# Patient Record
Sex: MALE | Race: WHITE
[De-identification: names, ages, dates, MRNs, and addresses within clinical notes are randomized per-mention and may not be internally consistent; named-entity substitution may affect disease eponyms.]

---

## 2019-03-11 ENCOUNTER — HOSPITAL ENCOUNTER (OUTPATIENT)
Dept: HOSPITAL 47 - RADXRMAIN | Age: 54
Discharge: HOME | End: 2019-03-11
Payer: COMMERCIAL

## 2019-03-11 DIAGNOSIS — M19.012: Primary | ICD-10-CM

## 2019-03-11 NOTE — XR
Left shoulder

 

HISTORY: Left shoulder pain

 

3 views of the left shoulder

 

There is joint space loss, remodeling the humeral head, marginal spurring and subchondral sclerosis a
nd geode formation. Alignment is maintained. Left lung apex as visualized is normal. Arthropathy pres
ent at the acromioclavicular joint. No fracture or dislocation.

 

IMPRESSION: Osteoarthritis is advanced within the left shoulder.

## 2019-08-07 ENCOUNTER — HOSPITAL ENCOUNTER (OUTPATIENT)
Dept: HOSPITAL 47 - BARWHC3 | Age: 54
Discharge: HOME | End: 2019-08-07
Payer: COMMERCIAL

## 2019-08-07 VITALS — HEART RATE: 68 BPM | DIASTOLIC BLOOD PRESSURE: 82 MMHG | SYSTOLIC BLOOD PRESSURE: 179 MMHG | TEMPERATURE: 98.8 F

## 2019-08-07 VITALS — BODY MASS INDEX: 60.7 KG/M2

## 2019-08-07 DIAGNOSIS — K76.9: ICD-10-CM

## 2019-08-07 DIAGNOSIS — Z87.891: ICD-10-CM

## 2019-08-07 DIAGNOSIS — N19: ICD-10-CM

## 2019-08-07 DIAGNOSIS — E89.1: ICD-10-CM

## 2019-08-07 DIAGNOSIS — D50.9: ICD-10-CM

## 2019-08-07 DIAGNOSIS — K90.9: ICD-10-CM

## 2019-08-07 DIAGNOSIS — E66.01: Primary | ICD-10-CM

## 2019-08-07 DIAGNOSIS — E55.9: ICD-10-CM

## 2019-08-07 DIAGNOSIS — K50.90: ICD-10-CM

## 2019-08-07 DIAGNOSIS — E21.1: ICD-10-CM

## 2019-08-07 LAB
APTT BLD: 25.1 SEC (ref 22–30)
ERYTHROCYTE [DISTWIDTH] IN BLOOD BY AUTOMATED COUNT: 4.76 M/UL (ref 4.3–5.9)
ERYTHROCYTE [DISTWIDTH] IN BLOOD: 13.5 % (ref 11.5–15.5)
HCT VFR BLD AUTO: 41.5 % (ref 39–53)
HGB BLD-MCNC: 13.4 GM/DL (ref 13–17.5)
INR PPP: 1 (ref ?–1.2)
MCH RBC QN AUTO: 28.2 PG (ref 25–35)
MCHC RBC AUTO-ENTMCNC: 32.4 G/DL (ref 31–37)
MCV RBC AUTO: 87 FL (ref 80–100)
PLATELET # BLD AUTO: 339 K/UL (ref 150–450)
PT BLD: 10.3 SEC (ref 9–12)
WBC # BLD AUTO: 6.9 K/UL (ref 3.8–10.6)

## 2019-08-07 PROCEDURE — 36415 COLL VENOUS BLD VENIPUNCTURE: CPT

## 2019-08-07 PROCEDURE — 80061 LIPID PANEL: CPT

## 2019-08-07 PROCEDURE — 84134 ASSAY OF PREALBUMIN: CPT

## 2019-08-07 PROCEDURE — 84100 ASSAY OF PHOSPHORUS: CPT

## 2019-08-07 PROCEDURE — 84443 ASSAY THYROID STIM HORMONE: CPT

## 2019-08-07 PROCEDURE — 83036 HEMOGLOBIN GLYCOSYLATED A1C: CPT

## 2019-08-07 PROCEDURE — 84590 ASSAY OF VITAMIN A: CPT

## 2019-08-07 PROCEDURE — 80053 COMPREHEN METABOLIC PANEL: CPT

## 2019-08-07 PROCEDURE — 82306 VITAMIN D 25 HYDROXY: CPT

## 2019-08-07 PROCEDURE — 82728 ASSAY OF FERRITIN: CPT

## 2019-08-07 PROCEDURE — 85730 THROMBOPLASTIN TIME PARTIAL: CPT

## 2019-08-07 PROCEDURE — 83970 ASSAY OF PARATHORMONE: CPT

## 2019-08-07 PROCEDURE — 84425 ASSAY OF VITAMIN B-1: CPT

## 2019-08-07 PROCEDURE — 82746 ASSAY OF FOLIC ACID SERUM: CPT

## 2019-08-07 PROCEDURE — 84630 ASSAY OF ZINC: CPT

## 2019-08-07 PROCEDURE — 82607 VITAMIN B-12: CPT

## 2019-08-07 PROCEDURE — 83735 ASSAY OF MAGNESIUM: CPT

## 2019-08-07 PROCEDURE — 85610 PROTHROMBIN TIME: CPT

## 2019-08-07 PROCEDURE — 83550 IRON BINDING TEST: CPT

## 2019-08-07 PROCEDURE — 83540 ASSAY OF IRON: CPT

## 2019-08-07 PROCEDURE — 85027 COMPLETE CBC AUTOMATED: CPT

## 2019-08-07 PROCEDURE — 93005 ELECTROCARDIOGRAM TRACING: CPT

## 2019-08-07 PROCEDURE — 84255 ASSAY OF SELENIUM: CPT

## 2019-08-07 NOTE — P.HPBAR
Bariatric H&P





- History & Physicial


H&P Date: 19


History & Physicial: 


Visit/CC: 





Patient initial contact: 





Initial weight: 


Initial weight in pounds: 


Height: 5 ft 7 in


Initial BMI: 





Last weight: 





Current weight: 175.812 kg


Current weight in pounds: 


Current BMI: 





Ideal body weight (based on NIH guidelines): 





Excess body weight loss: 








The patient is a 54 year-old M who presents for Bariatric Assessment.








Highest was 415 pounds and on his own went down to 320 pounds. He needs shoulder

surgery and requested by his doctors to have weight loss surgery. He reports new

depression with loss of occupation. He has hypertension. He denies sleep apnea. 

He has history of blood clots. He had lost 100 pounds in the past with a 

nutritionist as he had leg swelling. 








Needs full work-up


Recommend EGD


Recommend EKG





Past Medical History


Past Medical History: GERD/Reflux


Additional Past Medical History / Comment(s): Meniere's disease (Takes Dyazide 

for this condition), Left shoulder pain "bone on bone",


History of Any Multi-Drug Resistant Organisms: None Reported


Past Surgical History: Hernia Repair


Additional Past Surgical History / Comment(s): Left leg varicose vein stripping 

and ablation and sclerotherapy (3 times), Right leg vein stripping and 

sclerotherapy, (pt states 2 different doctors have told him that he has the 

"worst veins they've ever seen in their career."), umbilical hernia repair with 

mesh, stents placed in bilateral exteriror iliac veins and common iliac veins 

(stents were placed through incisions on back),


Past Anesthesia/Blood Transfusion Reactions: No Reported Reaction


Additional Past Anesthesia/Blood Transfusion Reaction / Comm: No history of 

blood transfusions to date


Past Psychological History: Depression


Additional Psychological History / Comment(s): 19: Pt states he takes herbal

supplements (had been given an antidepressment but it made him deathly sick, was

given another med to try but was fearful and so has been doing well on his 

herbal supplements)


Smoking Status: Former smoker


Past Alcohol Use History: Occasional


Additional Past Alcohol Use History / Comment(s): patient smoked off and on 

beginning at age 14 quit at age 39, highest smoking amount was 1 pack/day.  May 

have one or two beers/week.


Additional Drug Use History / Comment(s): uses hemp oil twice daily





- Past Family History


  ** Mother


Additional Family Medical History / Comment(s): bilateral knee and hip 

replacement





  ** Father


Family Medical History: Osteoarthritis (OA)


Additional Family Medical History / Comment(s):  at age 78





Bariatric Checklist


Checklist: 


Plan: 





Checklist: 





EGD: 


1. Hiatal hernia: 


2. H. Pylori: 





HgbA1c: 





Vitamin D: 





Smoking: Former smoker





Primary care physician referral: 





Psychiatry clearance: 





Cardiology clearance: 





Sleep study: 





Diet journal: 





VTE risk score: 





VTE risk level: 





Rehab needs at discharge:

## 2019-08-08 LAB
ALBUMIN SERPL-MCNC: 4.2 G/DL (ref 3.8–4.9)
ALBUMIN/GLOB SERPL: 1.75 G/DL (ref 1.6–3.17)
ALP SERPL-CCNC: 62 U/L (ref 41–126)
ALT SERPL-CCNC: 24 U/L (ref 10–49)
ANION GAP SERPL CALC-SCNC: 11.2 MMOL/L (ref 4–12)
AST SERPL-CCNC: 32 U/L (ref 14–35)
BUN SERPL-SCNC: 11 MG/DL (ref 9–27)
BUN/CREAT SERPL: 13.75 RATIO (ref 12–20)
CALCIUM SPEC-MCNC: 9.3 MG/DL (ref 8.7–10.3)
CHLORIDE SERPL-SCNC: 103 MMOL/L (ref 96–109)
CHOLEST SERPL-MCNC: 201 MG/DL (ref 0–200)
CO2 SERPL-SCNC: 27.8 MMOL/L (ref 21.6–31.8)
GLOBULIN SER CALC-MCNC: 2.4 G/DL (ref 1.6–3.3)
GLUCOSE SERPL-MCNC: 86 MG/DL (ref 70–110)
HBA1C MFR BLD: 5.7 % (ref 4–6)
HDLC SERPL-MCNC: 74 MG/DL (ref 40–60)
LDLC SERPL CALC-MCNC: 109.2 MG/DL (ref 0–131)
MAGNESIUM SPEC-SCNC: 2 MG/DL (ref 1.5–2.4)
POTASSIUM SERPL-SCNC: 4 MMOL/L (ref 3.5–5.5)
PROT SERPL-MCNC: 6.6 G/DL (ref 6.2–8.2)
SODIUM SERPL-SCNC: 142 MMOL/L (ref 135–145)
TRIGL SERPL-MCNC: 89 MG/DL (ref 0–149)
VLDLC SERPL CALC-MCNC: 17.8 MG/DL (ref 5–40)
ZINC SERPL-MCNC: 80 UG/DL (ref 60–130)

## 2019-08-09 LAB — VIT B1 BLD-MCNC: 90 UG/L (ref 38–122)

## 2019-10-07 ENCOUNTER — HOSPITAL ENCOUNTER (OUTPATIENT)
Dept: HOSPITAL 47 - ORWHC2ENDO | Age: 54
Discharge: HOME | End: 2019-10-07
Payer: COMMERCIAL

## 2019-10-07 VITALS — RESPIRATION RATE: 18 BRPM

## 2019-10-07 VITALS — TEMPERATURE: 97.7 F

## 2019-10-07 VITALS — HEART RATE: 57 BPM | SYSTOLIC BLOOD PRESSURE: 132 MMHG | DIASTOLIC BLOOD PRESSURE: 77 MMHG

## 2019-10-07 VITALS — BODY MASS INDEX: 60.7 KG/M2

## 2019-10-07 DIAGNOSIS — K21.0: Primary | ICD-10-CM

## 2019-10-07 DIAGNOSIS — K29.50: ICD-10-CM

## 2019-10-07 DIAGNOSIS — Z87.891: ICD-10-CM

## 2019-10-07 DIAGNOSIS — I10: ICD-10-CM

## 2019-10-07 DIAGNOSIS — F41.9: ICD-10-CM

## 2019-10-07 DIAGNOSIS — Z88.8: ICD-10-CM

## 2019-10-07 DIAGNOSIS — H81.09: ICD-10-CM

## 2019-10-07 DIAGNOSIS — K44.9: ICD-10-CM

## 2019-10-07 DIAGNOSIS — Z79.82: ICD-10-CM

## 2019-10-07 DIAGNOSIS — E66.01: ICD-10-CM

## 2019-10-07 DIAGNOSIS — M19.90: ICD-10-CM

## 2019-10-07 DIAGNOSIS — Z88.5: ICD-10-CM

## 2019-10-07 DIAGNOSIS — Z79.899: ICD-10-CM

## 2019-10-07 PROCEDURE — 88305 TISSUE EXAM BY PATHOLOGIST: CPT

## 2019-10-07 PROCEDURE — 43239 EGD BIOPSY SINGLE/MULTIPLE: CPT

## 2019-10-07 NOTE — P.PCN
Date of Procedure: 10/07/19


Description of Procedure: 











PREOPERATIVE DIAGNOSIS:


Gastroesophageal reflux disease.


Morbid obesity.





POSTOPERATIVE DIAGNOSIS:


Morbid obesity.


Gastritis.


Gastroesophageal reflux disease.


Diaphragmatic hiatal hernia





OPERATION:


Esophagogastroduodenoscopy with biopsies along antrum.





SURGEON: Suze Panda MD





ANESTHESIA: MAC.





INDICATIONS:


The patient is a 44-year-old female who presents with a history of reflux 

disease. Benefits and risks of the procedure were described. Informed consent 

was obtained.





DESCRIPTION:


The patient was brought into the endoscopy suite and laid in the left lateral 

decubitus position. An Olympus gastroscope was passed along the posterior 

oropharynx down to the distal esophagus where the squamocolumnar junction was 

encountered at 40 cm from the incisors. The stomach was entered and no bile 

reflux was found.  Additional findings are listed below. Biopsies with cold f

orceps were obtained of the antrum. The first through third portion of the 

duodenum was examined and unremarkable. Retroflexion of the scope confirmed  

Hill grade 2 lower esophageal valve. The squamocolumnar junction demonstrated LA

grade B erosive esophagitis. The stomach was desufflated. The patient tolerated 

the procedure well.





FINDINGS:


Squamocolumnar junction 40 cm from the incisors.


Diaphragmatic hiatus at 40 cm.


Hill grade 2 lower esophageal valve.


LA grade B erosive esophagitis.


No active duodenitis.


Chronic gastritis 





RECOMMENDATIONS:


Upper endoscopy as needed.





Plan - Discharge Summary


Discharge Rx Participant: No


New Discharge Prescriptions: 


No Action


   Valerian Root 300 mg PO HS


   Ginger 500 mg PO BID


   Cider Vinegar [Apple Cider Vinegar] 300 mg PO DAILY


   Multivit-Min/FA/Lycopen/Lutein [Centrum Silver Tablet] 1 each PO DAILY


   Aspirin [Adult Low Dose Aspirin EC] 81 mg PO DAILY


   Turmeric Root Extract [Turmeric] 1,000 mg PO BID


   Glucosamine Sulfate 1,500 mg PO DAILY


   Omeprazole [PriLOSEC] 40 mg PO DAILY


   Meloxicam [Mobic] 15 mg PO DAILY


   Triamterene-Hctz 37.5-25Mg [Dyazide 37.5-25 Capsule] 1 cap PO DAILY


   Anxiety/Stress [employer] Brand 1 tab PO HS


   [place] Hemp Oil 20 drop PO BID


   Losartan Potassium [Cozaar] 50 mg PO DAILY


   Ferrous Sulfate [Feosol] 325 mg PO DAILY


   Cetirizine HCl [Zyrtec] 10 mg PO DAILY


Discharge Medication List





Anxiety/Stress [employer] Brand 1 tab PO HS 08/07/19 [History]


[place] Hemp Oil 20 drop PO BID 08/07/19 [History]


Aspirin [Adult Low Dose Aspirin EC] 81 mg PO DAILY 08/07/19 [History]


Cider Vinegar [Apple Cider Vinegar] 300 mg PO DAILY 08/07/19 [History]


Ginger 500 mg PO BID 08/07/19 [History]


Glucosamine Sulfate 1,500 mg PO DAILY 08/07/19 [History]


Meloxicam [Mobic] 15 mg PO DAILY 08/07/19 [History]


Multivit-Min/FA/Lycopen/Lutein [Centrum Silver Tablet] 1 each PO DAILY 08/07/19 

[History]


Omeprazole [PriLOSEC] 40 mg PO DAILY 08/07/19 [History]


Triamterene-Hctz 37.5-25Mg [Dyazide 37.5-25 Capsule] 1 cap PO DAILY 08/07/19 

[History]


Turmeric Root Extract [Turmeric] 1,000 mg PO BID 08/07/19 [History]


Valerian Root 300 mg PO HS 08/07/19 [History]


Cetirizine HCl [Zyrtec] 10 mg PO DAILY 10/02/19 [History]


Ferrous Sulfate [Feosol] 325 mg PO DAILY 10/02/19 [History]


Losartan Potassium [Cozaar] 50 mg PO DAILY 10/02/19 [History]








Follow up Appointment(s)/Referral(s): 


[facility] [[facility] - 10/16/19


Patient Instructions/Handouts:  *[facility] - (Anesthesia) Endoscopy Discharge 

Instructions, Upper Endoscopy (DC), Diet for Stomach Ulcers and Gastritis (GEN),

Gastritis (DC)


Discharge Disposition: HOME SELF-CARE

## 2019-10-16 ENCOUNTER — HOSPITAL ENCOUNTER (OUTPATIENT)
Dept: HOSPITAL 47 - BARWHC3 | Age: 54
Discharge: HOME | End: 2019-10-16
Payer: COMMERCIAL

## 2019-10-16 VITALS — BODY MASS INDEX: 61 KG/M2

## 2019-10-16 VITALS — TEMPERATURE: 97.8 F | HEART RATE: 63 BPM | SYSTOLIC BLOOD PRESSURE: 186 MMHG | DIASTOLIC BLOOD PRESSURE: 115 MMHG

## 2019-10-16 DIAGNOSIS — F32.9: ICD-10-CM

## 2019-10-16 DIAGNOSIS — I82.409: ICD-10-CM

## 2019-10-16 DIAGNOSIS — Z79.82: ICD-10-CM

## 2019-10-16 DIAGNOSIS — H81.09: ICD-10-CM

## 2019-10-16 DIAGNOSIS — Z87.891: ICD-10-CM

## 2019-10-16 DIAGNOSIS — K21.9: ICD-10-CM

## 2019-10-16 DIAGNOSIS — Z79.1: ICD-10-CM

## 2019-10-16 DIAGNOSIS — D50.9: ICD-10-CM

## 2019-10-16 DIAGNOSIS — I11.9: ICD-10-CM

## 2019-10-16 DIAGNOSIS — Z79.899: ICD-10-CM

## 2019-10-16 DIAGNOSIS — E55.9: ICD-10-CM

## 2019-10-16 DIAGNOSIS — Z88.6: ICD-10-CM

## 2019-10-16 DIAGNOSIS — K29.50: ICD-10-CM

## 2019-10-16 DIAGNOSIS — M47.816: ICD-10-CM

## 2019-10-16 DIAGNOSIS — E66.01: Primary | ICD-10-CM

## 2019-10-16 DIAGNOSIS — M17.10: ICD-10-CM

## 2019-10-16 PROCEDURE — 99211 OFF/OP EST MAY X REQ PHY/QHP: CPT

## 2019-10-16 NOTE — P.PN
Subjective


Progress Note Date: 10/16/19








DATE OF SERVICE: 10/16/2019





CHIEF COMPLAINT: Morbid obesity





HISTORY OF PRESENT ILLNESS:  Ovi Olivarez is a 54-year-old male who comes with 

lifelong morbid obesity. He is looking into the sleeve gastrectomy. He completed

an upper endoscopy. He is doing better. No reports of abdominal pain. He comes 

in with new findings of vitamin deficiency. He also comes in with new EKG 

abnormalities.





At height of 5 feet 7 inches, his ideal body weight is 158 pounds.   His highest

weight was 415 pounds. His highest BMI was 65.1. He comes in 387 pounds.  His 

body mass index is 60.7.  He is 229 pounds overweight.





PAST MEDICAL HISTORY: 


1.  Morbid obesity due to excess calories


2.  Body mass index of 65.1, initial


3.  Osteoarthritis of the knees.


4.  Osteoarthritis of the lower back.


5.  Hypertensive heart disease.


6.  Gastroesophageal reflux disease


7.  DVT legs


8.  Meniere's disease


9.  Depressive disorder





PAST SURGICAL HISTORY: 


1. Varicose vein stripping


2. Umbilical hernia repair


3. Stents bilateral common iliac vein





HOME MEDICATIONS: 


                                Home Medications











 Medication  Instructions  Recorded  Confirmed


 


Anxiety/Stress [employer] Brand 1 tab PO HS 08/07/19 08/07/19


 


[place] Hemp Oil 20 drop PO BID 08/07/19 08/07/19


 


Aspirin [Adult Low Dose Aspirin EC] 81 mg PO DAILY 08/07/19 08/07/19


 


Cider Vinegar [Apple Cider Vinegar] 300 mg PO DAILY 08/07/19 08/07/19


 


Charito 500 mg PO BID 08/07/19 08/07/19


 


Glucosamine Sulfate 1,500 mg PO DAILY 08/07/19 08/07/19


 


Meloxicam [Mobic] 15 mg PO DAILY 08/07/19 08/07/19


 


Multivit-Min/FA/Lycopen/Lutein 1 each PO DAILY 08/07/19 08/07/19





[Centrum Silver Tablet]   


 


Omeprazole [PriLOSEC] 40 mg PO DAILY 08/07/19 08/07/19


 


Triamterene-Hctz 37.5-25Mg 1 cap PO DAILY 08/07/19 08/07/19





[Dyazide 37.5-25 Capsule]   


 


Turmeric Root Extract [Turmeric] 1,000 mg PO BID 08/07/19 08/07/19


 


Valerian Root 300 mg PO HS 08/07/19 08/07/19











ALLERGIES:


                                    Allergies











Allergy/AdvReac Type Severity Reaction Status Date / Time


 


morphine Allergy  Itching Verified 08/07/19 16:05











SOCIAL HISTORY: Past tobacco use.   





FAMILY HISTORY: No family history of ulcerative colitis disease or Crohn's 

disease. Family history of morbid obesity. No lupus in the family.  No reports 

of stomach or esophageal cancer.  





REVIEW OF ORGAN SYSTEMS: 


CONSTITUTIONAL: At height of 5 feet 7 inches, his ideal body weight is 158 

pounds.   His highest weight was 415 pounds. His highest BMI was 65.1. He comes 

in 387 pounds.  His body mass index is 60.7.  He is 229 pounds overweight.


HEENT: Denies any active troubles with vision or hearing.  


ENDOCRINE: No diabetes. No hypothyroidism. 


CARDIOVASCULAR: No past reports of palpitations or heart attacks or chest pain. 

 


RESPIRATORY: Denies daytime somnolence. No asthma. 


GASTROINTESTINAL: Denies any bright red blood per rectum.  No diarrhea. No 

constipation.


MUSCULOSKELETAL: Has lower back pain and joint pain.  Has osteoarthritis of the 

knees.  History of bilateral lower extremity edema.


NEURO: No headaches. No seizure disorders. 


PSYCH: Has depression. No suicidal ideation.   


RHEUMATOLOGIC: No lupus.  No rheumatoid arthritis.  


HEMATOLOGIC: Denies any abnormal bleeding or bruising.  Personal history of 

DVTs.


SKIN: No rash.  No skin cancer.





PHYSICAL EXAM: 


VITAL SIGNS: Height 5 foot 7 inches, weight 389 pounds. BMI 61.1


                                   Vital Signs











Temp  97.8 F   10/16/19 16:16


 


Pulse  63   10/16/19 16:16


 


Resp      


 


BP  186/115   10/16/19 16:16


 


Pulse Ox      











GENERAL: Well-developed in no acute distress.  


HEENT: No scleral icterus.  Extraocular movements grossly intact.  Hears 

conversational speech.  No nasal drainage.


NECK: Supple without lymphadenopathy. 


CHEST: Nonlabored respirations with equal bilateral excursions. 


CARDIOVASCULAR: Regular rate and regular rhythm. Distal 2+ pulses. 


ABDOMEN: Obese, soft, nontender, nondistended. 


MUSCULOSKELETAL: No clubbing, cyanosis. Gross strength 5/5 distal lower 

extremities.  


NEURO: No focal or lateralizing signs. Cranial nerves 2 through 12 grossly 

within normal limits. 


PSYCH: Appropriate affect. Alert and oriented to person, place and time.


SKIN: Good skin turgor.  Well perfused.








                             Laboratory Last Values











WBC  6.9 k/uL (3.8-10.6)   08/07/19  17:42    


 


RBC  4.76 m/uL (4.30-5.90)   08/07/19  17:42    


 


Hgb  13.4 gm/dL (13.0-17.5)   08/07/19  17:42    


 


Hct  41.5 % (39.0-53.0)   08/07/19  17:42    


 


MCV  87.0 fL (80.0-100.0)   08/07/19  17:42    


 


MCH  28.2 pg (25.0-35.0)   08/07/19  17:42    


 


MCHC  32.4 g/dL (31.0-37.0)   08/07/19  17:42    


 


RDW  13.5 % (11.5-15.5)   08/07/19  17:42    


 


Plt Count  339 k/uL (150-450)   08/07/19  17:42    


 


PT  10.3 sec (9.0-12.0)   08/07/19  17:42    


 


INR  1.0  (<1.2)   08/07/19  17:42    


 


APTT  25.1 sec (22.0-30.0)   08/07/19  17:42    


 


Sodium  142 mmol/L (135-145)   08/07/19  17:42    


 


Potassium  4.0 mmol/L (3.5-5.5)   08/07/19  17:42    


 


Chloride  103 mmol/L ()   08/07/19  17:42    


 


Carbon Dioxide  27.8 mmol/L (21.6-31.8)   08/07/19  17:42    


 


Anion Gap  11.20 mmol/L (4.00-12.00)   08/07/19  17:42    


 


BUN  11.0 mg/dL (9.0-27.0)   08/07/19  17:42    


 


Creatinine  0.8 mg/dL (0.6-1.5)   08/07/19  17:42    


 


Est GFR (CKD-EPI)AfAm  115.7   (60.0-200.0)   08/07/19  17:42    


 


Est GFR (CKD-EPI)NonAf  99.9   (60.0-200.0)   08/07/19  17:42    


 


BUN/Creatinine Ratio  13.75 Ratio (12.00-20.00)   08/07/19  17:42    


 


Glucose  86 mg/dL ()   08/07/19  17:42    


 


Estimated Ave Glu mg/dL  117   08/07/19  17:42    


 


Hemoglobin A1c  5.7 % (4.0-6.0)   08/07/19  17:42    


 


Calcium  9.3 mg/dL (8.7-10.3)   08/07/19  17:42    


 


Phosphorus  4.2 mg/dL (2.4-5.1)   08/07/19  17:42    


 


Magnesium  2.0 mg/dL (1.5-2.4)   08/07/19  17:42    


 


Iron  50 ug/dL ()  L  08/07/19  17:42    


 


TIBC  327 ug/dL (228-460)   08/07/19  17:42    


 


Iron Saturation  15.29   (15.00-50.00)   08/07/19  17:42    


 


Ferritin  41.5 ng/mL (22.0-322.0)   08/07/19  17:42    


 


Total Bilirubin  0.5 mg/dL (0.3-1.2)   08/07/19  17:42    


 


AST  32 U/L (14-35)   08/07/19  17:42    


 


ALT  24 U/L (10-49)   08/07/19  17:42    


 


Alkaline Phosphatase  62 U/L ()   08/07/19  17:42    


 


Total Protein  6.6 g/dL (6.2-8.2)   08/07/19  17:42    


 


Albumin  4.20 g/dL (3.80-4.90)   08/07/19  17:42    


 


Globulin  2.4 g/dL (1.6-3.3)   08/07/19  17:42    


 


Albumin/Globulin Ratio  1.75 g/dL (1.60-3.17)   08/07/19  17:42    


 


Prealbumin  25.0 mg/dL (18.0-42.0)   08/07/19  17:42    


 


Triglycerides  89.0 mg/dL (0.0-149.0)   08/07/19  17:42    


 


Cholesterol  201 mg/dL (0-200)  H  08/07/19  17:42    


 


LDL Cholesterol, Calc  109.2 mg/dL (0.0-131.0)   08/07/19  17:42    


 


VLDL Cholesterol, Calc  17.80 mg/dL (5.00-40.00)   08/07/19  17:42    


 


HDL Cholesterol  74.0 mg/dL (40.0-60.0)  H  08/07/19  17:42    


 


Cholesterol/HDL Ratio  2.72   08/07/19  17:42    


 


Vitamin A  63 ug/dL ()   08/07/19  17:42    


 


Vitamin B1  90 ug/L ()   08/07/19  17:42    


 


Vitamin B12  565.0 pg/mL (200.0-944.0)   08/07/19  17:42    


 


Vitamin D 25-Hydroxy  23.4 ng/mL (30.0-100.0)  L  08/07/19  17:42    


 


Folate  >24.0 ng/mL  08/07/19  17:42    


 


TSH  0.690 uIU/mL (0.350-5.500)   08/07/19  17:42    


 


PTH Intact  50.9 pg/mL (14.0-72.0)   08/07/19  17:42    


 


Selenium  122 mcg/L ()   08/07/19  17:42    


 


Zinc  80 ug/dL ()   08/07/19  17:42    


 


EKG  EKG PERFORMED   08/07/19  17:42    








Iron is low


Vitamin D is low








STUDIES:   EKG shows right ventricular conduction delay





EGD FINDINGS:


Squamocolumnar junction 40 cm from the incisors.


Diaphragmatic hiatus at 40 cm.


Hill grade 2 lower esophageal valve.


LA grade B erosive esophagitis.


No active duodenitis.


Chronic gastritis 





Final Pathologic Diagnosis


GASTRIC ANTRUM, BIOPSIES: Chronic gastritis.





Helicobacter organisms are not identified on routine H+E stained sections.








ASSESSMENT: 


1.  Morbid obesity due to excess calories


2.  Body mass index of 65.1, initial


3.  Osteoarthritis of the knees.


4.  Osteoarthritis of the lower back.


5.  Hypertensive heart disease.


6.  Gastroesophageal reflux disease


7.  DVT legs


8.  Meniere's disease


9.  Depressive disorder


10.  Iron deficiency anemia


11.  Vitamin D deficiency


12.  Chronic gastritis





PLAN: 


1.  Overall, EGD reviewed and medications reviewed.


2.  He has EKG abnormality and recommend cardiac risk assessment. 


3.  Labs reviewed with vitamin D deficiency.


4.  [facility] reviewed.











Objective





- Vital Signs


Vital signs: 


                                 Intake & Output











 10/15/19 10/16/19 10/16/19





 18:59 06:59 18:59


 


Weight   176.901 kg

## 2019-11-04 ENCOUNTER — HOSPITAL ENCOUNTER (OUTPATIENT)
Dept: HOSPITAL 47 - BARWHC3 | Age: 54
Discharge: HOME | End: 2019-11-04
Payer: COMMERCIAL

## 2019-11-04 VITALS — BODY MASS INDEX: 60.6 KG/M2

## 2019-11-04 DIAGNOSIS — E66.01: Primary | ICD-10-CM

## 2019-11-04 PROCEDURE — 97804 MEDICAL NUTRITION GROUP: CPT

## 2019-11-14 NOTE — P.GSHP
History of Present Illness


H&P Date: 10/07/19














CHIEF COMPLAINT: GERD





HISTORY OF PRESENT ILLNESS: The patient is a 54-year-old male who


presents reports gastroesophageal reflux disease.  Upper endoscopy was offered 

for further evaluation and management.





PAST MEDICAL HISTORY: 


Please see list.





PAST SURGICAL HISTORY: 


Please see list.





MEDICATIONS: 


Please see list.





ALLERGIES:  Please see list. 





SOCIAL HISTORY: No illicit drug use





FAMILY HISTORY: No reports of Crohn disease or ulcerative colitis. 





REVIEW OF ORGAN SYSTEMS: 


CONSTITUTIONAL: No reports of fevers or chills. 


GI:  Denies any blood in stools or constipation. 





PHYSICAL EXAM: 


VITAL SIGNS:  Stable


GENERAL: Well-developed and pleasant in no acute distress. 


HEENT: No scleral icterus. Extraocular movements grossly


intact. Moist buccal mucosa. 


NECK: Supple without lymphadenopathy. 


CHEST: Unlabored respirations. Equal bilateral excursions. 


CARDIOVASCULAR: Regular rate and rhythm. Distal 2+ pulses. 


ABDOMEN: Soft, nondistended.  


MUSCULOSKELETAL: No clubbing, cyanosis, or edema. 





ASSESSMENT: 


1.  Gastroesophageal reflux disease





PLAN: 


1. Recommend proceeding with an upper endoscopy





Past Medical History


Past Medical History: GERD/Reflux, Hypertension, Osteoarthritis (OA)


Additional Past Medical History / Comment(s): Meniere's disease (Takes Dyazide 

for this condition), Left shoulder pain "bone on bone",


History of Any Multi-Drug Resistant Organisms: None Reported


Past Surgical History: Hernia Repair


Additional Past Surgical History / Comment(s): Left leg varicose vein stripping 

and ablation and sclerotherapy (3 times), Right leg vein stripping and 

sclerotherapy,  umbilical hernia repair with mesh, stents placed in bilateral 

exteriror iliac veins and common iliac veins (stents were placed through 

incisions on back),


Past Anesthesia/Blood Transfusion Reactions: No Reported Reaction


Additional Past Anesthesia/Blood Transfusion Reaction / Comment(s): No history 

of blood transfusions to date


Smoking Status: Former smoker





- Past Family History


  ** Mother


Family Medical History: No Reported History


Additional Family Medical History / Comment(s): bilateral knee and hip 

replacement





  ** Father


Family Medical History: Osteoarthritis (OA)


Additional Family Medical History / Comment(s):  at age 78





Medications and Allergies


                                Home Medications











 Medication  Instructions  Recorded  Confirmed  Type


 


Anxiety/Stress [employer] Brand 1 tab PO HS 08/07/19 10/02/19 History


 


[place] Hemp Oil 20 drop PO BID 08/07/19 10/02/19 History


 


Aspirin [Adult Low Dose Aspirin EC] 81 mg PO DAILY 08/07/19 10/02/19 History


 


Cider Vinegar [Apple Cider Vinegar] 300 mg PO DAILY 08/07/19 10/02/19 History


 


Charito 500 mg PO BID 08/07/19 10/02/19 History


 


Glucosamine Sulfate 1,500 mg PO DAILY 08/07/19 10/02/19 History


 


Meloxicam [Mobic] 15 mg PO DAILY 08/07/19 10/02/19 History


 


Multivit-Min/FA/Lycopen/Lutein 1 each PO DAILY 08/07/19 10/02/19 History





[Centrum Silver Tablet]    


 


Omeprazole [PriLOSEC] 40 mg PO DAILY 08/07/19 10/02/19 History


 


Triamterene-Hctz 37.5-25Mg 1 cap PO DAILY 08/07/19 10/02/19 History





[Dyazide 37.5-25 Capsule]    


 


Turmeric Root Extract [Turmeric] 1,000 mg PO BID 08/07/19 10/02/19 History


 


Valerian Root 300 mg PO HS 08/07/19 10/02/19 History


 


Cetirizine HCl [Zyrtec] 10 mg PO DAILY 10/02/19 10/02/19 History


 


Ferrous Sulfate [Feosol] 325 mg PO DAILY 10/02/19 10/02/19 History


 


Losartan Potassium [Cozaar] 50 mg PO DAILY 10/02/19 10/02/19 History








                                    Allergies











Allergy/AdvReac Type Severity Reaction Status Date / Time


 


morphine Allergy  Itching Verified 10/02/19 15:08


 


nortriptyline Allergy  NAUSEA , Verified 10/02/19 15:09





   HEADACHE Muscle Hinge Flap Text: The defect edges were debeveled with a #15 scalpel blade.  Given the size, depth and location of the defect and the proximity to free margins a muscle hinge flap was deemed most appropriate.  Using a sterile surgical marker, an appropriate hinge flap was drawn incorporating the defect. The area thus outlined was incised with a #15 scalpel blade.  The skin margins were undermined to an appropriate distance in all directions utilizing iris scissors.

## 2020-09-15 ENCOUNTER — HOSPITAL ENCOUNTER (OUTPATIENT)
Dept: HOSPITAL 47 - LABPAT | Age: 55
Discharge: HOME | End: 2020-09-15
Payer: COMMERCIAL

## 2020-09-15 DIAGNOSIS — Z01.818: Primary | ICD-10-CM

## 2020-09-15 LAB
ALBUMIN SERPL-MCNC: 4.3 G/DL (ref 3.5–5)
ALP SERPL-CCNC: 60 U/L (ref 38–126)
ALT SERPL-CCNC: 38 U/L (ref 4–49)
ANION GAP SERPL CALC-SCNC: 8 MMOL/L
AST SERPL-CCNC: 45 U/L (ref 17–59)
BASOPHILS # BLD AUTO: 0.1 K/UL (ref 0–0.2)
BASOPHILS NFR BLD AUTO: 1 %
BUN SERPL-SCNC: 25 MG/DL (ref 9–20)
CALCIUM SPEC-MCNC: 9.4 MG/DL (ref 8.4–10.2)
CHLORIDE SERPL-SCNC: 94 MMOL/L (ref 98–107)
CO2 SERPL-SCNC: 30 MMOL/L (ref 22–30)
EOSINOPHIL # BLD AUTO: 0.1 K/UL (ref 0–0.7)
EOSINOPHIL NFR BLD AUTO: 1 %
ERYTHROCYTE [DISTWIDTH] IN BLOOD BY AUTOMATED COUNT: 5.15 M/UL (ref 4.3–5.9)
ERYTHROCYTE [DISTWIDTH] IN BLOOD: 13.1 % (ref 11.5–15.5)
GLUCOSE SERPL-MCNC: 90 MG/DL (ref 74–99)
HCT VFR BLD AUTO: 43.9 % (ref 39–53)
HGB BLD-MCNC: 13.8 GM/DL (ref 13–17.5)
LYMPHOCYTES # SPEC AUTO: 1.2 K/UL (ref 1–4.8)
LYMPHOCYTES NFR SPEC AUTO: 15 %
MCH RBC QN AUTO: 26.9 PG (ref 25–35)
MCHC RBC AUTO-ENTMCNC: 31.5 G/DL (ref 31–37)
MCV RBC AUTO: 85.3 FL (ref 80–100)
MONOCYTES # BLD AUTO: 0.5 K/UL (ref 0–1)
MONOCYTES NFR BLD AUTO: 7 %
NEUTROPHILS # BLD AUTO: 6.1 K/UL (ref 1.3–7.7)
NEUTROPHILS NFR BLD AUTO: 76 %
PLATELET # BLD AUTO: 346 K/UL (ref 150–450)
POTASSIUM SERPL-SCNC: 4.5 MMOL/L (ref 3.5–5.1)
PROT SERPL-MCNC: 7.5 G/DL (ref 6.3–8.2)
SODIUM SERPL-SCNC: 132 MMOL/L (ref 137–145)
WBC # BLD AUTO: 8 K/UL (ref 3.8–10.6)

## 2020-09-15 PROCEDURE — 36415 COLL VENOUS BLD VENIPUNCTURE: CPT

## 2020-09-15 PROCEDURE — 93005 ELECTROCARDIOGRAM TRACING: CPT

## 2020-09-15 PROCEDURE — 86900 BLOOD TYPING SEROLOGIC ABO: CPT

## 2020-09-15 PROCEDURE — 80053 COMPREHEN METABOLIC PANEL: CPT

## 2020-09-15 PROCEDURE — 86901 BLOOD TYPING SEROLOGIC RH(D): CPT

## 2020-09-15 PROCEDURE — 86850 RBC ANTIBODY SCREEN: CPT

## 2020-09-15 PROCEDURE — 85025 COMPLETE CBC W/AUTO DIFF WBC: CPT

## 2020-09-21 ENCOUNTER — HOSPITAL ENCOUNTER (INPATIENT)
Dept: HOSPITAL 47 - 2ORMAIN | Age: 55
LOS: 4 days | Discharge: HOME | DRG: 620 | End: 2020-09-25
Payer: COMMERCIAL

## 2020-09-21 VITALS — BODY MASS INDEX: 56.8 KG/M2

## 2020-09-21 DIAGNOSIS — E66.01: Primary | ICD-10-CM

## 2020-09-21 DIAGNOSIS — D50.9: ICD-10-CM

## 2020-09-21 DIAGNOSIS — Z88.5: ICD-10-CM

## 2020-09-21 DIAGNOSIS — E55.9: ICD-10-CM

## 2020-09-21 DIAGNOSIS — Z79.899: ICD-10-CM

## 2020-09-21 DIAGNOSIS — E83.42: ICD-10-CM

## 2020-09-21 DIAGNOSIS — I11.9: ICD-10-CM

## 2020-09-21 DIAGNOSIS — M10.9: ICD-10-CM

## 2020-09-21 DIAGNOSIS — M47.9: ICD-10-CM

## 2020-09-21 DIAGNOSIS — E87.1: ICD-10-CM

## 2020-09-21 DIAGNOSIS — M17.0: ICD-10-CM

## 2020-09-21 DIAGNOSIS — Z79.82: ICD-10-CM

## 2020-09-21 DIAGNOSIS — K21.9: ICD-10-CM

## 2020-09-21 DIAGNOSIS — Z79.01: ICD-10-CM

## 2020-09-21 DIAGNOSIS — Z83.49: ICD-10-CM

## 2020-09-21 DIAGNOSIS — H81.09: ICD-10-CM

## 2020-09-21 DIAGNOSIS — Z88.8: ICD-10-CM

## 2020-09-21 DIAGNOSIS — J98.11: ICD-10-CM

## 2020-09-21 DIAGNOSIS — Z79.1: ICD-10-CM

## 2020-09-21 DIAGNOSIS — E87.8: ICD-10-CM

## 2020-09-21 DIAGNOSIS — F32.9: ICD-10-CM

## 2020-09-21 DIAGNOSIS — Z98.890: ICD-10-CM

## 2020-09-21 DIAGNOSIS — E86.1: ICD-10-CM

## 2020-09-21 DIAGNOSIS — T50.2X5A: ICD-10-CM

## 2020-09-21 DIAGNOSIS — Z86.718: ICD-10-CM

## 2020-09-21 DIAGNOSIS — Z87.891: ICD-10-CM

## 2020-09-21 DIAGNOSIS — K29.50: ICD-10-CM

## 2020-09-21 LAB
ALBUMIN SERPL-MCNC: 4.4 G/DL (ref 3.5–5)
ALP SERPL-CCNC: 58 U/L (ref 38–126)
ALT SERPL-CCNC: 56 U/L (ref 4–49)
ANION GAP SERPL CALC-SCNC: 11 MMOL/L
AST SERPL-CCNC: 77 U/L (ref 17–59)
BUN SERPL-SCNC: 30 MG/DL (ref 9–20)
CALCIUM SPEC-MCNC: 9.3 MG/DL (ref 8.4–10.2)
CHLORIDE SERPL-SCNC: 92 MMOL/L (ref 98–107)
CO2 SERPL-SCNC: 26 MMOL/L (ref 22–30)
GLUCOSE BLD-MCNC: 89 MG/DL (ref 75–99)
GLUCOSE SERPL-MCNC: 92 MG/DL (ref 74–99)
POTASSIUM SERPL-SCNC: 3.8 MMOL/L (ref 3.5–5.1)
PROT SERPL-MCNC: 7.6 G/DL (ref 6.3–8.2)
SODIUM SERPL-SCNC: 129 MMOL/L (ref 137–145)

## 2020-09-21 PROCEDURE — 84100 ASSAY OF PHOSPHORUS: CPT

## 2020-09-21 PROCEDURE — 94760 N-INVAS EAR/PLS OXIMETRY 1: CPT

## 2020-09-21 PROCEDURE — 80053 COMPREHEN METABOLIC PANEL: CPT

## 2020-09-21 PROCEDURE — 85025 COMPLETE CBC W/AUTO DIFF WBC: CPT

## 2020-09-21 PROCEDURE — 74240 X-RAY XM UPR GI TRC 1CNTRST: CPT

## 2020-09-21 PROCEDURE — 86850 RBC ANTIBODY SCREEN: CPT

## 2020-09-21 PROCEDURE — 86901 BLOOD TYPING SEROLOGIC RH(D): CPT

## 2020-09-21 PROCEDURE — 84300 ASSAY OF URINE SODIUM: CPT

## 2020-09-21 PROCEDURE — 94762 N-INVAS EAR/PLS OXIMTRY CONT: CPT

## 2020-09-21 PROCEDURE — 83930 ASSAY OF BLOOD OSMOLALITY: CPT

## 2020-09-21 PROCEDURE — 94640 AIRWAY INHALATION TREATMENT: CPT

## 2020-09-21 PROCEDURE — 86900 BLOOD TYPING SEROLOGIC ABO: CPT

## 2020-09-21 PROCEDURE — 84443 ASSAY THYROID STIM HORMONE: CPT

## 2020-09-21 PROCEDURE — 83935 ASSAY OF URINE OSMOLALITY: CPT

## 2020-09-21 PROCEDURE — 83735 ASSAY OF MAGNESIUM: CPT

## 2020-09-21 PROCEDURE — 88307 TISSUE EXAM BY PATHOLOGIST: CPT

## 2020-09-21 PROCEDURE — 84295 ASSAY OF SERUM SODIUM: CPT

## 2020-09-21 RX ADMIN — CEFAZOLIN SCH MLS: 330 INJECTION, POWDER, FOR SOLUTION INTRAMUSCULAR; INTRAVENOUS at 10:33

## 2020-09-21 RX ADMIN — CEFAZOLIN SCH MLS/HR: 330 INJECTION, POWDER, FOR SOLUTION INTRAMUSCULAR; INTRAVENOUS at 17:12

## 2020-09-21 NOTE — P.PN
Progress Note - Text


Progress Note Date: 09/21/20


Diet adjusted to bariatric full liquid diet for protein shakes. Patient being 

seen by medicine for management of hyponatremia. Pending resolution of 

hyponatremia, sleeve gastrectomy for Wednesday

## 2020-09-21 NOTE — P.CONS
History of Present Illness





- Reason for Consult


Consult date: 20


Hyponatremia


Requesting physician: Suze Panda





- Chief Complaint


Hyponatremia





- History of Present Illness





55-year-old male with PMH of morbid obesity, hypertension, DVT completed 

Coumadin therapy presents to [facility] for elective surgery.  

He was planned to undergo gastric sleeve.  His surgery got held due to 

hyponatremia.  [facility], [place] physicians has been consulted for medical management of 

this patient.





Patient reports vague headaches has been ongoing for the past 2 days.  He 

reports lightheadedness especially when going from a laying or sitting to a 

standing position.  He denies any lower extremity edema, nausea or vomiting, 

fever or chills, cough, chest pain, shortness of breath, palpitations, changes 

in urination or bowel habits.  No numbness/weakness/tingling of the extremities.

 Of note, patient reports a restricted diet to 800 mable over the last few weeks. 

He has also reporting decreased salt intake.





Review of Systems





Pertinent positives and negatives as discussed in HPI, a complete review of 

systems was performed and all other systems are negative.





Past Medical History


Past Medical History: Chest Pain / Angina, Deep Vein Thrombosis (DVT), 

GERD/Reflux, Hypertension, Osteoarthritis (OA)


Additional Past Medical History / Comment(s): Meniere's disease (Takes Dyazide 

for this condition), LEFT LEG DVT; Left shoulder pain "bone on bone", varicose 

veins, "irritation in stomach", hx gout, LEFT LEG GANGRENE


History of Any Multi-Drug Resistant Organisms: None Reported


Past Surgical History: Hernia Repair


Additional Past Surgical History / Comment(s): Left leg varicose vein stripping 

and ablation and sclerotherapy (3 times), Right leg vein stripping and 

sclerotherapy,  umbilical hernia repair with mesh, stents placed in bilateral 

exteriror iliac veins and common iliac veins , surgery to remove gangrene left 

lower leg


Past Anesthesia/Blood Transfusion Reactions: No Reported Reaction


Additional Past Anesthesia/Blood Transfusion Reaction / Comm: No history of 

blood transfusions to date


Past Psychological History: Depression


Smoking Status: Former smoker


Past Alcohol Use History: Occasional


Additional Past Alcohol Use History / Comment(s): STARTED SMOKING AT AGE 13 QUIT

ON AND OFF LAST QUIT AT AGE 39 SMOKED 3/4 - 1PPD


Past Drug Use History: None Reported





- Past Family History


  ** Mother


Family Medical History: No Reported History


Additional Family Medical History / Comment(s): .





  ** Father


Family Medical History: Osteoarthritis (OA)


Additional Family Medical History / Comment(s):  at age 78





Medications and Allergies


                                Home Medications











 Medication  Instructions  Recorded  Confirmed  Type


 


Aspirin [Adult Low Dose Aspirin EC] 81 mg PO DAILY 19 History


 


Cider Vinegar [Apple Cider Vinegar] 300 mg PO DAILY 19 History


 


Charito 500 mg PO DAILY 19 History


 


Multivit-Min/FA/Lycopen/Lutein 1 each PO DAILY 19 History





[Centrum Silver Tablet]    


 


Omeprazole [PriLOSEC] 40 mg PO DAILY 19 History


 


Triamterene-Hctz 37.5-25Mg 1 cap PO DAILY 19 History





[Dyazide 37.5-25 Capsule]    


 


Turmeric Root Extract [Turmeric] 1,000 mg PO BID 19 History


 


Valerian Root 300 mg PO HS 19 History


 


Cetirizine HCl [Zyrtec] 10 mg PO DAILY PRN 10/02/19 09/14/20 History


 


Ferrous Sulfate [Feosol] 325 mg PO DAILY 10/02/19 09/14/20 History


 


Losartan Potassium [Cozaar] 100 mg PO DAILY 10/02/19 09/14/20 History


 


Naproxen 500 mg PO BID 07/15/20 09/14/20 History








                                    Allergies











Allergy/AdvReac Type Severity Reaction Status Date / Time


 


morphine Allergy  Itching/ Verified 20 08:54


 


nortriptyline Allergy  NAUSEA , Verified 20 14:52





   HEADACHE  














Physical Exam


Vitals: 


                                   Vital Signs











  Temp Pulse Pulse Resp BP BP Pulse Ox


 


 20 11:00  97.9 F   66  16   127/76  98


 


 20 10:52  97.9 F  68   18  127/76   97


 


 20 08:56  96.8 F L   67  20   127/69  99


 


 20 08:38  96.8 F L   67  20   127/69  99








                                Intake and Output











 20





 22:59 06:59 14:59


 


Intake Total   200


 


Balance   200


 


Intake:   


 


  IV   200


 


Other:   


 


  Weight   174.6 kg














General: [non toxic], [no distress], [appears at stated age], morbidly obese


Derm: [warm], [dry]


Head: [atraumatic], [normocephalic], [symmetric]


Eyes: [EOMI], [no lid lag], [anicteric sclera]


Mouth: [no lip lesion], [mucus membranes moist]


Cardiovascular: [S1S2 reg], [no murmur], [positive posterior tibial pulse 

bilateral], 


Lungs: [CTA bilateral], [no rhonchi, no rales] , [no accessory muscle use]


Abdominal: [soft], [ nontender to palpation], [no guarding], [no appreciable 

organomegaly]


Ext: [no gross muscle atrophy], [no edema], [no contractures]


Neuro: [ CN II-XI grossly intact], [no focal neuro deficits]


Psych: [Alert], [oriented], [appropriate affect] 





Results


CBC & Chem 7: 


                                 20 08:43


Labs: 


                  Abnormal Lab Results - Last 24 Hours (Table)











  20 Range/Units





  08:43 


 


Sodium  129 L  (137-145)  mmol/L


 


Chloride  92 L  ()  mmol/L


 


BUN  30 H  (9-20)  mg/dL


 


AST  77 H  (17-59)  U/L


 


ALT  56 H  (4-49)  U/L














Assessment and Plan


Assessment: 





Symptomatic hypochloremic hyponatremia, hypovolemic


Elevated liver enzymes


Hypertension


GERD


Morbid obesity with BMI 56.8





Patient's hyponatremia is likely hypovolemic related to diuretic use and low 

salt intake.  Losartan, triamterene and hydrochlorothiazide will be held.  

Patient has been started on normal saline at 130 mL/h.  Urine and serum 

osmolality ordered along with urine sodium.  We will repeat BMP this afternoon. 

His elevated liver enzymes is likely related to fatty liver and his LFT will be 

repeated tomorrow morning.  His vital signs are currently stable and will be 

watched, medications adjusted if necessary.  Continue Protonix for GERD.  

General surgery on board for sleeve gastrectomy.  He is on Lovenox for DVT 

prophylaxis.





Thank you for this consultation.  We will continue to follow this patient 

through his clinical course.  Please call sound physicians with any additional 

questions or concerns.

## 2020-09-21 NOTE — P.HPADDEND
H&P Addendum


H&P Addendum Date: 09/21/20





Notified by anesthesiologist, Dr. Tadeo, patient's sodium is extremely low, 129.

 Patient reports having headaches and troubles with mentation during his two 

week protein diet and recent weight loss of almost 40 pounds.  Patient reports 

decreased swelling of the bilateral legs.  As patient has symptomatic 

hyponatremia, will admit.  Patient re-scheduled for sleeve gastrectomy during 

current hospitalization after symptomatic hyponatremia resolves

## 2020-09-21 NOTE — P.GSHP
History of Present Illness


H&P Date: 20








DATE OF SERVICE: 2020





CHIEF COMPLAINT: Morbid obesity





HISTORY OF PRESENT ILLNESS:  Ovi Olivarez is a 55-year-old male who comes with 

lifelong morbid obesity. He is looking into the sleeve gastrectomy. He completed

an upper endoscopy. 





At height of 5 feet 7 inches, his ideal body weight is 158 pounds.   His highest

weight was 415 pounds. His highest BMI was 65.1. He comes in 386 pounds from 387

pounds, 1 year ago. He lost 1 pound in 1 year.  His body mass index is 60.6.  He

is 228 pounds overweight.





PAST MEDICAL HISTORY: 


1.  Morbid obesity due to excess calories


2.  Body mass index of 65.1, initial


3.  Osteoarthritis of the knees.


4.  Osteoarthritis of the lower back.


5.  Hypertensive heart disease.


6.  Gastroesophageal reflux disease


7.  DVT legs


8.  Meniere's disease


9.  Depressive disorder





PAST SURGICAL HISTORY: 


1. Varicose vein stripping


2. Umbilical hernia repair


3. Stents bilateral common iliac vein





HOME MEDICATIONS: 


                                Home Medications











 Medication  Instructions  Recorded  Confirmed


 


Anxiety/Stress [employer] 1 tab PO HS 19


 


[place] [employer] Oil 20 drop PO BID 19


 


Aspirin [Adult Low Dose Aspirin EC] 81 mg PO DAILY 19


 


Cider Vinegar [Apple Cider Vinegar] 300 mg PO DAILY 19


 


Charito 500 mg PO BID 19


 


Glucosamine Sulfate 1,500 mg PO DAILY 19


 


Meloxicam [Mobic] 15 mg PO DAILY 19


 


Multivit-Min/FA/Lycopen/Lutein 1 each PO DAILY 19





[Centrum Silver Tablet]   


 


Omeprazole [PriLOSEC] 40 mg PO DAILY 19


 


Triamterene-Hctz 37.5-25Mg 1 cap PO DAILY 19





[Dyazide 37.5-25 Capsule]   


 


Turmeric Root Extract [Turmeric] 1,000 mg PO BID 19


 


Valerian Root 300 mg PO HS 19











ALLERGIES:


                                    Allergies











Allergy/AdvReac Type Severity Reaction Status Date / Time


 


morphine Allergy  Itching Verified 19 16:05











SOCIAL HISTORY: Past tobacco use.   





FAMILY HISTORY: No family history of ulcerative colitis disease or Crohn's 

disease. Family history of morbid obesity. No lupus in the family.  No reports 

of stomach or esophageal cancer.  





REVIEW OF ORGAN SYSTEMS: 


CONSTITUTIONAL: At height of 5 feet 7 inches, his ideal body weight is 158 

pounds.   His highest weight was 415 pounds. His highest BMI was 65.1. 


HEENT: Denies any active troubles with vision or hearing.  


ENDOCRINE: No diabetes. No hypothyroidism. 


CARDIOVASCULAR: No past reports of palpitations or heart attacks or chest pain. 




RESPIRATORY: Denies daytime somnolence. No asthma. 


GASTROINTESTINAL: Denies any bright red blood per rectum.  No diarrhea. No 

constipation.


MUSCULOSKELETAL: Has lower back pain and joint pain.  Has osteoarthritis of the 

knees.  History of bilateral lower extremity edema.


NEURO: No headaches. No seizure disorders. 


PSYCH: Has depression. No suicidal ideation.   


RHEUMATOLOGIC: No lupus.  No rheumatoid arthritis.  


HEMATOLOGIC: Denies any abnormal bleeding or bruising.  Personal history of 

DVTs.


SKIN: No rash.  No skin cancer.





PHYSICAL EXAM: 


VITAL SIGNS: Height 5 foot 7 inches, weight 386 pounds. BMI 60.6





GENERAL: Well-developed in no acute distress.  


HEENT: No scleral icterus.  Extraocular movements grossly intact.  Hears 

conversational speech.  No nasal drainage.


NECK: Supple without lymphadenopathy. 


CHEST: Nonlabored respirations with equal bilateral excursions. 


CARDIOVASCULAR: Regular rate and regular rhythm. Distal 2+ pulses. 


ABDOMEN: Obese, soft, nontender, nondistended. 


MUSCULOSKELETAL: No clubbing, cyanosis. Gross strength 5/5 distal lower 

extremities.  


NEURO: No focal or lateralizing signs. Cranial nerves 2 through 12 grossly 

within normal limits. 


PSYCH: Appropriate affect. Alert and oriented to person, place and time.


SKIN: Good skin turgor.  Well perfused.





ASSESSMENT: 


1.  Morbid obesity due to excess calories


2.  Body mass index of 65.1 to 60.6


3.  Osteoarthritis of the knees.


4.  Osteoarthritis of the lower back.


5.  Hypertensive heart disease.


6.  Gastroesophageal reflux disease


7.  DVT legs


8.  Meniere's disease


9.  Depressive disorder


10.  Iron deficiency anemia


11.  Vitamin D deficiency


12.  Chronic gastritis





PLAN: 


1.  Bariatric options between a sleeve, band and a Luis M-en-Y gastric bypass were

reviewed in detail. The patient elected for a sleeve gastrectomy.  Robotic 

assisted approach described.


2. The [place] [facility] Data was also reviewed with benefits and

risks as described.  


3. An 8 page second-generation bariatric consent form was reviewed in detail 

including potential of bleeding, infection, leaks, adequate weight loss, 

nutritional deficiencies which the patient demonstrated understanding of the 

risks.


4. A 2 week high-protein low caloric 800 kcal diet described to address 

hepatomegaly.


5.  Preoperative labs including complete metabolic panel and CBC with type and 

screen recommended.


6.  DVT prophylaxis per [place] [facility].


7.  Antibiotic prophylaxis.


8.  Inpatient hospitalization anticipated for more than 2 nights.


9.  All questions and concerns were addressed with the patient.





Past Medical History


Past Medical History: Chest Pain / Angina, Deep Vein Thrombosis (DVT), 

GERD/Reflux, Hypertension, Osteoarthritis (OA)


Additional Past Medical History / Comment(s): Meniere's disease (Takes Dyazide 

for this condition), Left shoulder pain "bone on bone", varicose veins, sm 

"irritation in stomach", hx gout, hx gangrene "both lower legs"


History of Any Multi-Drug Resistant Organisms: None Reported


Past Surgical History: Hernia Repair


Additional Past Surgical History / Comment(s): Left leg varicose vein stripping 

and ablation and sclerotherapy (3 times), Right leg vein stripping and 

sclerotherapy,  umbilical hernia repair with mesh, stents placed in bilateral 

exteriror iliac veins and common iliac veins , surgery to remove gangrene left 

lower leg


Past Anesthesia/Blood Transfusion Reactions: No Reported Reaction


Additional Past Anesthesia/Blood Transfusion Reaction / Comment(s): No history 

of blood transfusions to date


Smoking Status: Former smoker





- Past Family History


  ** Mother


Family Medical History: No Reported History


Additional Family Medical History / Comment(s): .





  ** Father


Family Medical History: Osteoarthritis (OA)


Additional Family Medical History / Comment(s):  at age 78





Medications and Allergies


                                Home Medications











 Medication  Instructions  Recorded  Confirmed  Type


 


Aspirin [Adult Low Dose Aspirin EC] 81 mg PO DAILY 19 History


 


Cider Vinegar [Apple Cider Vinegar] 300 mg PO DAILY 19 History


 


Charito 500 mg PO DAILY 19 History


 


Multivit-Min/FA/Lycopen/Lutein 1 each PO DAILY 19 History





[Centrum Silver Tablet]    


 


Omeprazole [PriLOSEC] 40 mg PO DAILY 19 History


 


Triamterene-Hctz 37.5-25Mg 1 cap PO DAILY 19 History





[Dyazide 37.5-25 Capsule]    


 


Turmeric Root Extract [Turmeric] 1,000 mg PO BID 19 History


 


Valerian Root 300 mg PO HS 19 History


 


Cetirizine HCl [Zyrtec] 10 mg PO DAILY PRN 10/02/19 09/14/20 History


 


Ferrous Sulfate [Feosol] 325 mg PO DAILY 10/02/19 09/14/20 History


 


Losartan Potassium [Cozaar] 100 mg PO DAILY 10/02/19 09/14/20 History


 


Naproxen 500 mg PO BID 07/15/20 09/14/20 History








                                    Allergies











Allergy/AdvReac Type Severity Reaction Status Date / Time


 


morphine Allergy  Itching/ Verified 20 08:54


 


nortriptyline Allergy  NAUSEA , Verified 20 14:52





   HEADACHE

## 2020-09-22 LAB
BASOPHILS # BLD AUTO: 0 K/UL (ref 0–0.2)
BASOPHILS NFR BLD AUTO: 1 %
EOSINOPHIL # BLD AUTO: 0 K/UL (ref 0–0.7)
EOSINOPHIL NFR BLD AUTO: 1 %
ERYTHROCYTE [DISTWIDTH] IN BLOOD BY AUTOMATED COUNT: 4.48 M/UL (ref 4.3–5.9)
ERYTHROCYTE [DISTWIDTH] IN BLOOD: 13 % (ref 11.5–15.5)
HCT VFR BLD AUTO: 37.8 % (ref 39–53)
HGB BLD-MCNC: 12.3 GM/DL (ref 13–17.5)
LYMPHOCYTES # SPEC AUTO: 1.2 K/UL (ref 1–4.8)
LYMPHOCYTES NFR SPEC AUTO: 22 %
MAGNESIUM SPEC-SCNC: 1.7 MG/DL (ref 1.5–2.4)
MCH RBC QN AUTO: 27.3 PG (ref 25–35)
MCHC RBC AUTO-ENTMCNC: 32.4 G/DL (ref 31–37)
MCV RBC AUTO: 84.2 FL (ref 80–100)
MONOCYTES # BLD AUTO: 0.5 K/UL (ref 0–1)
MONOCYTES NFR BLD AUTO: 9 %
NEUTROPHILS # BLD AUTO: 3.4 K/UL (ref 1.3–7.7)
NEUTROPHILS NFR BLD AUTO: 66 %
PLATELET # BLD AUTO: 276 K/UL (ref 150–450)
SODIUM SERPL-SCNC: 133 MMOL/L (ref 135–145)
WBC # BLD AUTO: 5.2 K/UL (ref 3.8–10.6)

## 2020-09-22 RX ADMIN — CEFAZOLIN SCH: 330 INJECTION, POWDER, FOR SOLUTION INTRAMUSCULAR; INTRAVENOUS at 01:13

## 2020-09-22 RX ADMIN — PANTOPRAZOLE SODIUM SCH MG: 40 INJECTION, POWDER, FOR SOLUTION INTRAVENOUS at 10:28

## 2020-09-22 RX ADMIN — ENOXAPARIN SODIUM SCH MG: 40 INJECTION SUBCUTANEOUS at 10:25

## 2020-09-22 RX ADMIN — CEFAZOLIN SCH MLS/HR: 330 INJECTION, POWDER, FOR SOLUTION INTRAMUSCULAR; INTRAVENOUS at 16:07

## 2020-09-22 RX ADMIN — CEFAZOLIN SCH MLS/HR: 330 INJECTION, POWDER, FOR SOLUTION INTRAMUSCULAR; INTRAVENOUS at 10:27

## 2020-09-22 NOTE — P.PN
Subjective


Progress Note Date: 09/22/20





CHIEF COMPLAINT: Morbid obesity





HISTORY OF PRESENT ILLNESS: Patient had to postpone the sleeve gastrectomy due 

to symptomatic hyponatremia.  Sodium has now come up from 129-133.  He is 

feeling better.  Patient's headache has improved.  He denies any nausea or 

vomiting.  He reports regular bowel movements.  He is afebrile.  WBC 5.2.  

Magnesium 1.7





PHYSICAL EXAM: 


VITAL SIGNS: Reviewed


GENERAL: Well-developed in no acute distress. 


HEENT:  No sclera icterus. Extraocular movements grossly intact.  Moist buccal 

mucosa. 


Head is atraumatic, normocephalic. Hears conversational speech. No nasal 

drainage.


NECK:  Supple without lymphadenopathy.


CHEST:  Non-labored respirations and equal bilateral excursions. 


CARDIOVASCULAR:  Palpable 2+ radial pulses.


ABDOMEN:  Soft.  Nondistended. Nontender.


MUSCULOSKELETAL:  No clubbing or cyanosis.


NEUROLOGIC:  No focal or lateralizing signs.  Cranial nerves II through XII 

grossly intact.


PSYCH:  Appropriate affect.  Alert and oriented to person, place and time.


SKIN: Well perfused.  Good skin turgor. 





ASSESSMENT: 


1.  Morbid obesity


2.  Hypovolemic hyponatremia


3.  Hypomagnesemia





PLAN: 


-Patient is tentatively scheduled for robotic sleeve gastrectomy tomorrow, 

09/23/2020 with Dr. Panda


-Patient nothing by mouth after midnight


-Continue with IV fluids, normal saline at 130 cc per hour


-Continue to monitor sodium level


-Continue to hold diuretics


-Replace magnesium and recheck lab in a.m.





Physician Assistant note has been reviewed by physician. Signing provider agrees

with the documented findings, assessment, and plan of care. 





Objective





- Vital Signs


Vital signs: 


                                   Vital Signs











Temp  97.9 F   09/22/20 12:19


 


Pulse  54 L  09/22/20 12:19


 


Resp  18   09/22/20 12:19


 


BP  163/91   09/22/20 12:19


 


Pulse Ox  99   09/22/20 12:19








                                 Intake & Output











 09/21/20 09/22/20 09/22/20





 18:59 06:59 18:59


 


Intake Total 520  200


 


Output Total 1000  


 


Balance -480  200


 


Weight 174.6 kg  


 


Intake:   


 


    


 


  Oral 320  200


 


Output:   


 


  Urine 1000  


 


Other:   


 


  Voiding Method  Toilet 


 


  # Voids  1 














- Labs


CBC & Chem 7: 


                                 09/22/20 06:05





                                 09/22/20 06:06


Labs: 


                  Abnormal Lab Results - Last 24 Hours (Table)











  09/22/20 09/22/20 Range/Units





  06:05 06:06 


 


Hgb  12.3 L   (13.0-17.5)  gm/dL


 


Hct  37.8 L   (39.0-53.0)  %


 


Sodium   133 L  (135-145)  mmol/L

## 2020-09-22 NOTE — P.PN
Subjective


Progress Note Date: 09/22/20





Patient was seen and examined.  No acute events overnight.  Patient denies any 

headaches or lightheadedness today.  He denies any chest pain, shortness of 

breath or palpitations.  No nausea or vomiting.  No fever or chills.





Objective





- Vital Signs


Vital signs: 


                                   Vital Signs











Temp  97.9 F   09/22/20 12:19


 


Pulse  54 L  09/22/20 12:19


 


Resp  18   09/22/20 12:19


 


BP  163/91   09/22/20 12:19


 


Pulse Ox  99   09/22/20 12:19








                                 Intake & Output











 09/21/20 09/22/20 09/22/20





 18:59 06:59 18:59


 


Intake Total 520  


 


Output Total 1000  


 


Balance -480  


 


Weight 174.6 kg  


 


Intake:   


 


    


 


  Oral 320  


 


Output:   


 


  Urine 1000  


 


Other:   


 


  Voiding Method  Toilet 


 


  # Voids  1 














- Exam





General: [non toxic], [no distress], [appears at stated age], morbidly obese


Derm: [warm], [dry]


Head: [atraumatic], [normocephalic], [symmetric]


Eyes: [EOMI], [no lid lag], [anicteric sclera]


Mouth: [no lip lesion], [mucus membranes moist]


Cardiovascular: [S1S2 reg], [no murmur], [positive posterior tibial pulse 

bilateral], 


Lungs: [CTA bilateral], [no rhonchi, no rales] , [no accessory muscle use]


Abdominal: [soft], [ nontender to palpation], [no guarding], [no appreciable 

organomegaly]


Ext: [no gross muscle atrophy], [no edema], [no contractures]


Neuro:  [no focal neuro deficits]


Psych: [Alert], [oriented], [appropriate affect] 








- Labs


CBC & Chem 7: 


                                 09/22/20 06:05





                                 09/22/20 06:06


Labs: 


                  Abnormal Lab Results - Last 24 Hours (Table)











  09/22/20 09/22/20 Range/Units





  06:05 06:06 


 


Hgb  12.3 L   (13.0-17.5)  gm/dL


 


Hct  37.8 L   (39.0-53.0)  %


 


Sodium   133 L  (135-145)  mmol/L














Assessment and Plan


Assessment: 





Symptomatic hypochloremic hyponatremia, hypovolemic


Elevated liver enzymes


Hypertension


GERD


Morbid obesity with BMI 56.8





His sodium is improved from 129-133.  Patient's hyponatremia is likely 

hypovolemic related to diuretic use and low salt intake.  Losartan, triamterene 

and hydrochlorothiazide has been held.  Patient has been started on normal 

saline at 130 mL/h. repeat BMP tomorrow morning.





His elevated liver enzymes is likely related to fatty liver and his LFT will be 

repeated tomorrow morning.  





Patient will be started on amlodipine by mouth.  His vital signs are currently 

stable and will be watched, medications adjusted if necessary.  





Continue Protonix for GERD.  





General surgery on board for sleeve gastrectomy.  He is on Lovenox for DVT 

prophylaxis.





Thank you for this consultation.  We will continue to follow this patient 

through his clinical course.  Please call [facility] physicians with any additional 

questions or concerns.

## 2020-09-23 LAB
ALBUMIN SERPL-MCNC: 3.5 G/DL (ref 3.5–5)
ALBUMIN/GLOB SERPL: 1.3 {RATIO}
ALP SERPL-CCNC: 38 U/L (ref 38–126)
ALT SERPL-CCNC: 45 U/L (ref 4–49)
ANION GAP SERPL CALC-SCNC: 7 MMOL/L
AST SERPL-CCNC: 44 U/L (ref 17–59)
BUN SERPL-SCNC: 11 MG/DL (ref 9–20)
CALCIUM SPEC-MCNC: 8.5 MG/DL (ref 8.4–10.2)
CHLORIDE SERPL-SCNC: 102 MMOL/L (ref 98–107)
CO2 SERPL-SCNC: 23 MMOL/L (ref 22–30)
GLOBULIN SER CALC-MCNC: 2.7 G/DL
GLUCOSE SERPL-MCNC: 85 MG/DL (ref 74–99)
POTASSIUM SERPL-SCNC: 3.7 MMOL/L (ref 3.5–5.1)
PROT SERPL-MCNC: 6.2 G/DL (ref 6.3–8.2)
SODIUM SERPL-SCNC: 132 MMOL/L (ref 137–145)

## 2020-09-23 PROCEDURE — 0DJ08ZZ INSPECTION OF UPPER INTESTINAL TRACT, VIA NATURAL OR ARTIFICIAL OPENING ENDOSCOPIC: ICD-10-PCS

## 2020-09-23 PROCEDURE — 0DB64Z3 EXCISION OF STOMACH, PERCUTANEOUS ENDOSCOPIC APPROACH, VERTICAL: ICD-10-PCS

## 2020-09-23 PROCEDURE — 8E0W4CZ ROBOTIC ASSISTED PROCEDURE OF TRUNK REGION, PERCUTANEOUS ENDOSCOPIC APPROACH: ICD-10-PCS

## 2020-09-23 RX ADMIN — POTASSIUM CHLORIDE SCH: 14.9 INJECTION, SOLUTION INTRAVENOUS at 16:58

## 2020-09-23 RX ADMIN — SIMETHICONE SCH MG: 20 SUSPENSION/ DROPS ORAL at 18:34

## 2020-09-23 RX ADMIN — PANTOPRAZOLE SODIUM SCH MG: 40 INJECTION, POWDER, FOR SOLUTION INTRAVENOUS at 09:02

## 2020-09-23 RX ADMIN — ONDANSETRON SCH MG: 2 INJECTION INTRAMUSCULAR; INTRAVENOUS at 18:25

## 2020-09-23 RX ADMIN — CEFAZOLIN SCH MLS/HR: 330 INJECTION, POWDER, FOR SOLUTION INTRAMUSCULAR; INTRAVENOUS at 09:03

## 2020-09-23 RX ADMIN — THIAMINE HYDROCHLORIDE SCH MLS/HR: 100 INJECTION, SOLUTION INTRAMUSCULAR; INTRAVENOUS at 17:38

## 2020-09-23 RX ADMIN — ACETAMINOPHEN SCH MLS/HR: 10 INJECTION, SOLUTION INTRAVENOUS at 18:30

## 2020-09-23 RX ADMIN — CEFAZOLIN SCH: 330 INJECTION, POWDER, FOR SOLUTION INTRAMUSCULAR; INTRAVENOUS at 18:07

## 2020-09-23 RX ADMIN — ISODIUM CHLORIDE SCH MG: 0.03 SOLUTION RESPIRATORY (INHALATION) at 20:01

## 2020-09-23 RX ADMIN — ENOXAPARIN SODIUM SCH MG: 40 INJECTION SUBCUTANEOUS at 09:38

## 2020-09-23 RX ADMIN — HYOSCYAMINE SULFATE SCH MG: 0.12 SOLUTION/ DROPS ORAL at 18:26

## 2020-09-23 RX ADMIN — ONDANSETRON SCH MG: 2 INJECTION INTRAMUSCULAR; INTRAVENOUS at 15:30

## 2020-09-23 RX ADMIN — CEFAZOLIN SCH MLS/HR: 330 INJECTION, POWDER, FOR SOLUTION INTRAMUSCULAR; INTRAVENOUS at 00:19

## 2020-09-23 NOTE — P.OP
Date of Procedure: 09/23/20


Description of Procedure: 











SURGEON:  AC CAVAZOS MD





PREOPERATIVE DIAGNOSES:  


1.  Morbid obesity. 





POSTOPERATIVE DIAGNOSES:  


1.  Morbid obesity. 





OPERATION:       


1.  Robotic assisted daVinci Xi laparoscopic sleeve gastrectomy with 40-Occitan 

bougie, multiport.


2.  Intraoperative esophagogastroduodenoscopy.





ANESTHESIA:  Gen. local anesthetic


ESTIMATED BLOOD LOSS: 30  mL 


SPECIMENS REMOVED: Sleeve gastrectomy


COMPLICATIONS:  None. 





INDICATIONS: is a 55-year-old male with long-standing morbid obesity. He is 

looking into a sleeve gastrectomy. 








All surgical options for morbid obesity had been described using the [place] 

bariatric surgery collaborative comorbidity resolution including complication 

risk score.  A second-generation bariatric consent form was described in detail 

including the possibility of protein malnutrition, leaks, gastric stricture, 

venous thrombosis, gastroesophageal reflux disease, need for further surgery for

which he demonstrated understanding. Benefits and risks of the procedure were 

described at length. Informed consent was obtained. 





DESCRIPTION: The patient was brought into the operating room theater. 

Preoperatively he had received


Lovenox subcutaneously for DVT prophylaxis. Additionally he had Peridex oral 

solution as an oral decontaminant. 





After general induction, the abdomen was prepped and draped in standard sterile 

fashion. An Ioban draping was placed along the abdomen.  Orozco catheter was 

placed.





A robotic da Ricky Xi system was prepped and primed.





At 15 cm from the xiphoid, proposed port sites were marked with indelible marker

along the anterior axillary line bilaterally, mid axillary line bilaterally with

each ports were marked 10 to 15 cm from each other. The assistant port was 

marked along the left lateral abdominal wall.  The robotic stapler port was 

marked for the right midclavicular line.





A 5 mm 0 degrees laparoscopic trocar entry was performed  along the left upper 

quadrant. The abdomen was insufflated to 15 mmHg pressure he tolerated well.  


Diagnostic laparoscopy demonstrated no injury to bowel, viscera, or mesentery.  

The liver surface was unremarkable without evidence of hepatomegaly or fatty 

liver disease.  No injury had occurred to the small bowel or viscera.   Along 

the hiatus no recurrent hiatal hernia was found.





A 8 mm port was placed along the right upper abdominal wall after exchanging the

5 mm port.  A separate 8 mm port was placed along the left lateral abdominal 

wall. Please note that the ports were placed at least 20 cm away from the target

anatomy. Care was taken to check each robotic arms were safely away from 

collision with the bed or the patient. At the epigastrium, a medium sized N

athanson liver retractor was placed under direct visualization with the Iron 

Intern placed under the right shoulder of the patient. Next, 12-mm robot stapler

port was placed along the right upper quadrant. The camera 8-mm port was 

maintained along the epigastrium. The patient was repositioned in reverse 

Trendelenburg position at 16-degrees after lowering the bed. 


The robot was docked along the left side of the patient.





Using a grasper for arm 4, a veseel sealer for arm 3, including grasper for arm 

1, the robotic system was docked and primed as described.  


Instruments were interchanged by the assistant for stapler loads.  The camera 

was placed at 30-degrees down.





I had sat at the console. 





The pylorus was identified and 6 cm proximally along the greater curvature of 

the stomach, the short gastrics were mobilized


upwards to the angle of His using a vessel sealer.  Hemostasis was excellent 

during this portion of the procedure. Next, the upper pole of the stomach was


adherent to the left angus, which was gently dissected free using atraumatic 

grasper.  





The nursing anesthetist placed a 40-Occitan blunted tip bougie into the stomach.





Robotic stapler black loads 60 mm x 2 followed by green 60 mm x 3 were used to 

create the sleeve.  Initial firing was across the antrum of the stomach towards 

the angle of His. The staple line was completely hemostatic and linear without 

corkscrewing. Hemostasis was excellent.  The space from the angularis incisura 

of the sleeve was approximately 4 cm. 





I then went to the head of the bed to perform the intraoperative


esophagogastroduodenoscopy leak test.  The upper pole of the stomach was bathed 

using normal saline solution. 


The scope was withdrawn with careful inspection along the staple line


for which no leaks were found along the entire length. Additionally,


the sleeve was completely hemostatic without any encroachment along the


angularis incisura. Its topology was a soft "J". No stricture was encountered 

upon placement of the


scope. The GI tract was desufflated. The patient tolerated this portion


of the procedure well.  The scope was completely withdrawn.





The robot was undocked.





I then rescrubbed into case, whereby the irrigation fluid was aspirated


from the abdominal cavity. Tisseel fibrin sealant was placed along the


entire staple length. Once dried the Manuel liver retractor was


removed. 





Attention was now brought to removal of the specimen. The distal end of the 

sleeve gastrectomy specimen was brought out through the


12 mm port at the left upper quadrant. The specimen was gently removed en total,

corresponding to 20.5 cm x 5.5 cm


sleeve gastrectomy specimen. No contamination had occurred during this process. 





All instruments and pneumoperitoneum including irrigation fluid was removed from

the abdominal cavity. The 12 mm port site was irrigated


with warm normal saline solution and diluted hydron peroxide.  The 12-mm port 

site was reapproximated using 0 Vicryl and Khoi-Divya of the left upper 

quadrant.  The final incisions were closed using subcuticular interrupted suture

of 4-0 Monocryl. 





Dermabond was applied to the skin once the skin had been cleansed.  OptiFoam 

dressing was placed along the stomach extraction site.





At the end of the procedure, needle, sponge, and instrument count was verified 

correct by the surgical technician. The patient was taken to the postanesthesia 

care unit in stable condition. 





He had tolerated the procedure well. Intraoperative films and findings were 

reviewed with the patient's family.





FINDINGS: 


1.  Negative intraoperative esophagogastrojejunoscopy leak test. 


2.  No large hiatus hernia.


3.  Total of 7 staplers used including 1 - 60 mm black and 2 - 60 mm green and 

4- greesn robot staples used to create the gastric sleeve.


4.  Sleeve gastrectomy 30 x 5 cm

## 2020-09-23 NOTE — P.HPADDEND
H&P Addendum


H&P Addendum Date: 09/23/20





Patient low sodium has improved from 129 to 133. Patient is clinically stable. 

Will proceed with sleeve gastrectomy.

## 2020-09-24 LAB
ALBUMIN SERPL-MCNC: 3.4 G/DL (ref 3.8–4.9)
ALBUMIN/GLOB SERPL: 2 G/DL (ref 1.6–3.17)
ALP SERPL-CCNC: 39 U/L (ref 41–126)
ALT SERPL-CCNC: 41 U/L (ref 10–49)
ANION GAP SERPL CALC-SCNC: 6.8 MMOL/L (ref 4–12)
AST SERPL-CCNC: 38 U/L (ref 14–35)
BASOPHILS # BLD AUTO: 0 K/UL (ref 0–0.2)
BASOPHILS NFR BLD AUTO: 0 %
BUN SERPL-SCNC: 10 MG/DL (ref 9–27)
BUN/CREAT SERPL: 12.5 RATIO (ref 12–20)
CALCIUM SPEC-MCNC: 8.3 MG/DL (ref 8.7–10.3)
CHLORIDE SERPL-SCNC: 106 MMOL/L (ref 96–109)
CO2 SERPL-SCNC: 25.2 MMOL/L (ref 21.6–31.8)
EOSINOPHIL # BLD AUTO: 0.1 K/UL (ref 0–0.7)
EOSINOPHIL NFR BLD AUTO: 1 %
ERYTHROCYTE [DISTWIDTH] IN BLOOD BY AUTOMATED COUNT: 4.11 M/UL (ref 4.3–5.9)
ERYTHROCYTE [DISTWIDTH] IN BLOOD: 13.4 % (ref 11.5–15.5)
GLOBULIN SER CALC-MCNC: 1.7 G/DL (ref 1.6–3.3)
GLUCOSE SERPL-MCNC: 86 MG/DL (ref 70–110)
HCT VFR BLD AUTO: 35 % (ref 39–53)
HGB BLD-MCNC: 11.3 GM/DL (ref 13–17.5)
LYMPHOCYTES # SPEC AUTO: 1.1 K/UL (ref 1–4.8)
LYMPHOCYTES NFR SPEC AUTO: 15 %
MAGNESIUM SPEC-SCNC: 1.5 MG/DL (ref 1.5–2.4)
MCH RBC QN AUTO: 27.6 PG (ref 25–35)
MCHC RBC AUTO-ENTMCNC: 32.3 G/DL (ref 31–37)
MCV RBC AUTO: 85.3 FL (ref 80–100)
MONOCYTES # BLD AUTO: 0.6 K/UL (ref 0–1)
MONOCYTES NFR BLD AUTO: 8 %
NEUTROPHILS # BLD AUTO: 5.6 K/UL (ref 1.3–7.7)
NEUTROPHILS NFR BLD AUTO: 74 %
PLATELET # BLD AUTO: 298 K/UL (ref 150–450)
POTASSIUM SERPL-SCNC: 4.1 MMOL/L (ref 3.5–5.5)
PROT SERPL-MCNC: 5.1 G/DL (ref 6.2–8.2)
SODIUM SERPL-SCNC: 138 MMOL/L (ref 135–145)
WBC # BLD AUTO: 7.5 K/UL (ref 3.8–10.6)

## 2020-09-24 RX ADMIN — ONDANSETRON SCH MG: 2 INJECTION INTRAMUSCULAR; INTRAVENOUS at 06:24

## 2020-09-24 RX ADMIN — SIMETHICONE SCH MG: 20 SUSPENSION/ DROPS ORAL at 17:11

## 2020-09-24 RX ADMIN — SIMETHICONE SCH MG: 20 SUSPENSION/ DROPS ORAL at 06:24

## 2020-09-24 RX ADMIN — ONDANSETRON SCH MG: 2 INJECTION INTRAMUSCULAR; INTRAVENOUS at 00:41

## 2020-09-24 RX ADMIN — HYOSCYAMINE SULFATE SCH MG: 0.12 SOLUTION/ DROPS ORAL at 00:41

## 2020-09-24 RX ADMIN — SIMETHICONE SCH MG: 20 SUSPENSION/ DROPS ORAL at 00:41

## 2020-09-24 RX ADMIN — POTASSIUM CHLORIDE SCH: 14.9 INJECTION, SOLUTION INTRAVENOUS at 06:25

## 2020-09-24 RX ADMIN — ONDANSETRON SCH MG: 2 INJECTION INTRAMUSCULAR; INTRAVENOUS at 13:03

## 2020-09-24 RX ADMIN — TRIAMTERENE AND HYDROCHLOROTHIAZIDE SCH EACH: 25; 37.5 CAPSULE ORAL at 09:44

## 2020-09-24 RX ADMIN — ACETAMINOPHEN SCH MLS/HR: 10 INJECTION, SOLUTION INTRAVENOUS at 13:02

## 2020-09-24 RX ADMIN — ACETAMINOPHEN SCH MLS/HR: 10 INJECTION, SOLUTION INTRAVENOUS at 06:23

## 2020-09-24 RX ADMIN — THIAMINE HYDROCHLORIDE SCH MLS/HR: 100 INJECTION, SOLUTION INTRAMUSCULAR; INTRAVENOUS at 00:37

## 2020-09-24 RX ADMIN — ACETAMINOPHEN SCH MLS/HR: 10 INJECTION, SOLUTION INTRAVENOUS at 00:38

## 2020-09-24 RX ADMIN — HYOSCYAMINE SULFATE SCH MG: 0.12 SOLUTION/ DROPS ORAL at 17:11

## 2020-09-24 RX ADMIN — ONDANSETRON SCH MG: 2 INJECTION INTRAMUSCULAR; INTRAVENOUS at 17:11

## 2020-09-24 RX ADMIN — LOSARTAN POTASSIUM SCH MG: 50 TABLET, FILM COATED ORAL at 09:44

## 2020-09-24 RX ADMIN — MAGNESIUM SULFATE IN DEXTROSE SCH MLS/HR: 10 INJECTION, SOLUTION INTRAVENOUS at 15:48

## 2020-09-24 RX ADMIN — ISODIUM CHLORIDE SCH MG: 0.03 SOLUTION RESPIRATORY (INHALATION) at 08:10

## 2020-09-24 RX ADMIN — SIMETHICONE SCH MG: 20 SUSPENSION/ DROPS ORAL at 13:02

## 2020-09-24 RX ADMIN — PANTOPRAZOLE SODIUM SCH MG: 40 INJECTION, POWDER, FOR SOLUTION INTRAVENOUS at 09:44

## 2020-09-24 RX ADMIN — CEFAZOLIN SCH: 330 INJECTION, POWDER, FOR SOLUTION INTRAMUSCULAR; INTRAVENOUS at 00:02

## 2020-09-24 RX ADMIN — THIAMINE HYDROCHLORIDE SCH: 100 INJECTION, SOLUTION INTRAMUSCULAR; INTRAVENOUS at 06:23

## 2020-09-24 RX ADMIN — HYOSCYAMINE SULFATE SCH MG: 0.12 SOLUTION/ DROPS ORAL at 06:24

## 2020-09-24 RX ADMIN — ISODIUM CHLORIDE SCH MG: 0.03 SOLUTION RESPIRATORY (INHALATION) at 15:55

## 2020-09-24 RX ADMIN — MAGNESIUM SULFATE IN DEXTROSE SCH MLS/HR: 10 INJECTION, SOLUTION INTRAVENOUS at 17:10

## 2020-09-24 RX ADMIN — ENOXAPARIN SODIUM SCH MG: 40 INJECTION SUBCUTANEOUS at 09:43

## 2020-09-24 RX ADMIN — HYOSCYAMINE SULFATE SCH MG: 0.12 SOLUTION/ DROPS ORAL at 13:03

## 2020-09-24 RX ADMIN — ISODIUM CHLORIDE SCH: 0.03 SOLUTION RESPIRATORY (INHALATION) at 21:14

## 2020-09-24 RX ADMIN — ISODIUM CHLORIDE SCH MG: 0.03 SOLUTION RESPIRATORY (INHALATION) at 11:31

## 2020-09-24 NOTE — P.PN
Subjective


Progress Note Date: 09/24/20





Patient was seen and examined.  No acute events overnight.  Underwent gastric 

sleeve yesterday.  Patient denies any headaches or lightheadedness today.  

Reports some abdominal discomfort at the site of incision.  Tolerating clear 

liquid diet.  He denies any chest pain, shortness of breath or palpitations.  No

nausea or vomiting.  No fever or chills.





Objective





- Vital Signs


Vital signs: 


                                   Vital Signs











Temp  98.6 F   09/24/20 07:16


 


Pulse  72   09/24/20 11:43


 


Resp  17   09/24/20 07:16


 


BP  154/77   09/24/20 07:16


 


Pulse Ox  95   09/24/20 11:36








                                 Intake & Output











 09/23/20 09/24/20 09/24/20





 18:59 06:59 18:59


 


Intake Total 3840  200


 


Output Total 30 575 


 


Balance 3810 -575 200


 


Weight   174.6 kg


 


Intake:   


 


  IV 3840  


 


    Sodium Chloride 0.9% 1, 1040  





    000 ml @ 130 mls/hr IV .   





    Q7H42M [facility] Rx#:[id number]   


 


  Oral   200


 


Output:   


 


  Urine  575 


 


  Estimated Blood Loss 30  


 


Other:   


 


  Voiding Method Toilet Toilet 


 


  # Voids  1 














- Exam





General: [non toxic], [no distress], [appears at stated age], morbidly obese


Derm: [warm], [dry]


Head: [atraumatic], [normocephalic], [symmetric]


Eyes: [EOMI], [no lid lag], [anicteric sclera]


Mouth: [no lip lesion], [mucus membranes moist]


Cardiovascular: [S1S2 reg], [no murmur], [positive posterior tibial pulse 

bilateral], 


Lungs: [CTA bilateral], [no rhonchi, no rales] , [no accessory muscle use]


Abdominal: [soft], [ nontender to palpation], [no guarding], [no appreciable 

organomegaly]


Ext: [no gross muscle atrophy], [no edema], [no contractures]


Neuro:  [no focal neuro deficits]


Psych: [Alert], [oriented], [appropriate affect] 








- Labs


CBC & Chem 7: 


                                 09/24/20 05:56





                                 09/24/20 05:56


Labs: 


                  Abnormal Lab Results - Last 24 Hours (Table)











  09/24/20 09/24/20 Range/Units





  05:56 05:56 


 


RBC   4.11 L  (4.30-5.90)  m/uL


 


Hgb   11.3 L  (13.0-17.5)  gm/dL


 


Hct   35.0 L  (39.0-53.0)  %


 


Calcium  8.3 L   (8.7-10.3)  mg/dL


 


AST  38 H   (14-35)  U/L


 


Alkaline Phosphatase  39 L   ()  U/L


 


Total Protein  5.1 L   (6.2-8.2)  g/dL


 


Albumin  3.40 L   (3.80-4.90)  g/dL














Assessment and Plan


Assessment: 





Elevated liver enzymes


Hypertension


GERD


Morbid obesity with BMI 56.8


Resolved: Hyponatremia





His elevated liver enzymes is likely related to fatty liver and his LFT will be 

repeated tomorrow morning.  





Patient will be started on amlodipine by mouth.  Triamterene and 

hydrochlorothiazide has been restarted.  Losartan restarted.  His vital signs 

are currently stable and will be watched, medications adjusted if necessary.  





Continue Protonix for GERD.  





General surgery on board for sleeve gastrectomy.  He is on Lovenox for DVT 

prophylaxis.





Thank you for this consultation.  We will continue to follow this patient 

through his clinical course.  Please call sound physicians with any additional 

questions or concerns.

## 2020-09-24 NOTE — P.PN
Subjective


Progress Note Date: 09/24/20





CHIEF COMPLAINT: Morbid obesity





HISTORY OF PRESENT ILLNESS: Patient is postop day #1 status post Robotic 

assisted daVinci Xi laparoscopic sleeve gastrectomy and Intraoperative 

esophagogastroduodenoscopy.  Patient is reporting some abdominal pain.  He is 

not passing any gas yet.  He denies any nausea or vomiting.  He is only used the

PCA a couple of times.  He has been up and ambulating in the hallway.  He is 

afebrile.  WBC 7.5 hemoglobin 11.3 magnesium 1.5 potassium 4.1 sodium 138





Upper GI shows no evidence of leak or significant obstruction status post recent

gastric sleeve surgery





PHYSICAL EXAM: 


VITAL SIGNS: Reviewed


GENERAL: Well-developed in no acute distress. 


HEENT:  No sclera icterus. Extraocular movements grossly intact.  Moist buccal 

mucosa. 


Head is atraumatic, normocephalic. Hears conversational speech. No nasal 

drainage.


NECK:  Supple without lymphadenopathy.


CHEST:  Non-labored respirations and equal bilateral excursions. 


CARDIOVASCULAR:  Palpable 2+ radial pulses.


ABDOMEN:  Soft.  Nondistended. Nontender.


MUSCULOSKELETAL:  No clubbing or cyanosis.


NEUROLOGIC:  No focal or lateralizing signs.  Cranial nerves II through XII 

grossly intact.


PSYCH:  Appropriate affect.  Alert and oriented to person, place and time.


SKIN: Well perfused.  Good skin turgor. 





ASSESSMENT: 


1.  Morbid obesity


2.  Hypovolemic hyponatremia resolved


3.  Hypomagnesemia





PLAN: 


-Replace magnesium


-Continue bariatric clear diet


-Encourage patient to ambulate and use incentive spirometer


-DVT prophylaxis Lovenox


-Anticipate discharge possibly tomorrow





Physician Assistant note has been reviewed by physician. Signing provider agrees

with the documented findings, assessment, and plan of care. 





Objective





- Vital Signs


Vital signs: 


                                   Vital Signs











Temp  98.6 F   09/24/20 07:16


 


Pulse  72   09/24/20 11:43


 


Resp  17   09/24/20 07:16


 


BP  154/77   09/24/20 07:16


 


Pulse Ox  95   09/24/20 11:36








                                 Intake & Output











 09/23/20 09/24/20 09/24/20





 18:59 06:59 18:59


 


Intake Total 3840  400


 


Output Total 30 575 


 


Balance 3810 -575 400


 


Weight   174.6 kg


 


Intake:   


 


  IV 3840  


 


    Sodium Chloride 0.9% 1, 1040  





    000 ml @ 130 mls/hr IV .   





    Q7H42M [facility] Rx#:[id number]   


 


  Oral   400


 


Output:   


 


  Urine  575 


 


  Estimated Blood Loss 30  


 


Other:   


 


  Voiding Method Toilet Toilet 


 


  # Voids  1 














- Labs


CBC & Chem 7: 


                                 09/24/20 05:56





                                 09/24/20 05:56


Labs: 


                  Abnormal Lab Results - Last 24 Hours (Table)











  09/24/20 09/24/20 Range/Units





  05:56 05:56 


 


RBC   4.11 L  (4.30-5.90)  m/uL


 


Hgb   11.3 L  (13.0-17.5)  gm/dL


 


Hct   35.0 L  (39.0-53.0)  %


 


Calcium  8.3 L   (8.7-10.3)  mg/dL


 


AST  38 H   (14-35)  U/L


 


Alkaline Phosphatase  39 L   ()  U/L


 


Total Protein  5.1 L   (6.2-8.2)  g/dL


 


Albumin  3.40 L   (3.80-4.90)  g/dL

## 2020-09-24 NOTE — FL
EXAMINATION TYPE: FL UGI

 

DATE OF EXAM: 9/24/2020

 

LIMITED UGI:

 

CLINICAL HISTORY:  Morbid Obesity, gastric sleeve surgery yesterday.

 

TECHNIQUE:  Limited upper GI-esophagram is performed utilizing 25 oz of Isovue-370. A total of 17 sec
onds of fluoroscopic time was utilized during procedure. 26 spot images saved to PACS.

 

COMPARISON:  None.

 

FINDINGS:  Exam slightly suboptimal secondary to patient's large body habitus. The patient swallowed 
contrast without difficulty or delay.  Esophageal peristalsis and motility are within normal limits. 
 There is good flow of contrast along the diaphragmatic hiatus into proximal stomach and subsequent m
ild delay in flow at proximal anastomosis into sleeve. There is mild delay in flow from distal sleeve
 an anastomosis into pylorus and duodenal sweep. Patient remains asymptomatic. There is no evidence o
f contrast extravasation to suggest leak. 

 

IMPRESSION: No evidence of leak or significant obstruction status post recent gastric sleeve surgery 
yesterday.
normal...

## 2020-09-25 VITALS — HEART RATE: 72 BPM

## 2020-09-25 VITALS — RESPIRATION RATE: 19 BRPM | DIASTOLIC BLOOD PRESSURE: 73 MMHG | TEMPERATURE: 98.6 F | SYSTOLIC BLOOD PRESSURE: 148 MMHG

## 2020-09-25 LAB
BASOPHILS # BLD AUTO: 0 K/UL (ref 0–0.2)
BASOPHILS NFR BLD AUTO: 1 %
EOSINOPHIL # BLD AUTO: 0.1 K/UL (ref 0–0.7)
EOSINOPHIL NFR BLD AUTO: 1 %
ERYTHROCYTE [DISTWIDTH] IN BLOOD BY AUTOMATED COUNT: 4.14 M/UL (ref 4.3–5.9)
ERYTHROCYTE [DISTWIDTH] IN BLOOD: 13.5 % (ref 11.5–15.5)
GLUCOSE BLD-MCNC: 106 MG/DL (ref 75–99)
GLUCOSE BLD-MCNC: 80 MG/DL (ref 75–99)
GLUCOSE BLD-MCNC: 83 MG/DL (ref 75–99)
HCT VFR BLD AUTO: 34.9 % (ref 39–53)
HGB BLD-MCNC: 11.7 GM/DL (ref 13–17.5)
LYMPHOCYTES # SPEC AUTO: 1 K/UL (ref 1–4.8)
LYMPHOCYTES NFR SPEC AUTO: 13 %
MCH RBC QN AUTO: 28.2 PG (ref 25–35)
MCHC RBC AUTO-ENTMCNC: 33.4 G/DL (ref 31–37)
MCV RBC AUTO: 84.2 FL (ref 80–100)
MONOCYTES # BLD AUTO: 0.5 K/UL (ref 0–1)
MONOCYTES NFR BLD AUTO: 6 %
NEUTROPHILS # BLD AUTO: 6.1 K/UL (ref 1.3–7.7)
NEUTROPHILS NFR BLD AUTO: 78 %
PLATELET # BLD AUTO: 266 K/UL (ref 150–450)
WBC # BLD AUTO: 7.8 K/UL (ref 3.8–10.6)

## 2020-09-25 RX ADMIN — SIMETHICONE SCH MG: 20 SUSPENSION/ DROPS ORAL at 00:18

## 2020-09-25 RX ADMIN — MAGNESIUM SULFATE IN DEXTROSE SCH MLS/HR: 10 INJECTION, SOLUTION INTRAVENOUS at 16:32

## 2020-09-25 RX ADMIN — ONDANSETRON SCH MG: 2 INJECTION INTRAMUSCULAR; INTRAVENOUS at 12:35

## 2020-09-25 RX ADMIN — ISODIUM CHLORIDE SCH MG: 0.03 SOLUTION RESPIRATORY (INHALATION) at 08:15

## 2020-09-25 RX ADMIN — ONDANSETRON SCH MG: 2 INJECTION INTRAMUSCULAR; INTRAVENOUS at 06:08

## 2020-09-25 RX ADMIN — ONDANSETRON SCH MG: 2 INJECTION INTRAMUSCULAR; INTRAVENOUS at 00:18

## 2020-09-25 RX ADMIN — HYOSCYAMINE SULFATE SCH MG: 0.12 SOLUTION/ DROPS ORAL at 06:08

## 2020-09-25 RX ADMIN — LOSARTAN POTASSIUM SCH MG: 50 TABLET, FILM COATED ORAL at 08:23

## 2020-09-25 RX ADMIN — SIMETHICONE SCH MG: 20 SUSPENSION/ DROPS ORAL at 06:08

## 2020-09-25 RX ADMIN — ISODIUM CHLORIDE SCH MG: 0.03 SOLUTION RESPIRATORY (INHALATION) at 15:45

## 2020-09-25 RX ADMIN — MAGNESIUM SULFATE IN DEXTROSE SCH MLS/HR: 10 INJECTION, SOLUTION INTRAVENOUS at 15:26

## 2020-09-25 RX ADMIN — ENOXAPARIN SODIUM SCH MG: 40 INJECTION SUBCUTANEOUS at 08:23

## 2020-09-25 RX ADMIN — PANTOPRAZOLE SODIUM SCH MG: 40 INJECTION, POWDER, FOR SOLUTION INTRAVENOUS at 08:23

## 2020-09-25 RX ADMIN — TRIAMTERENE AND HYDROCHLOROTHIAZIDE SCH EACH: 25; 37.5 CAPSULE ORAL at 08:23

## 2020-09-25 RX ADMIN — HYOSCYAMINE SULFATE SCH MG: 0.12 SOLUTION/ DROPS ORAL at 00:18

## 2020-09-25 RX ADMIN — ISODIUM CHLORIDE SCH MG: 0.03 SOLUTION RESPIRATORY (INHALATION) at 11:09

## 2020-09-25 RX ADMIN — HYOSCYAMINE SULFATE SCH MG: 0.12 SOLUTION/ DROPS ORAL at 12:35

## 2020-09-25 RX ADMIN — SIMETHICONE SCH MG: 20 SUSPENSION/ DROPS ORAL at 12:35

## 2020-09-25 NOTE — P.DS
Providers


Date of admission: 


09/21/20 08:02





Expected date of discharge: 09/25/20


Attending physician: 


Suze Panda





Consults: 





                                        





09/21/20 10:05


Consult Physician Urgent 


   Consulting Provider: Radha Rowley


   Consult Reason/Comments: Hyponatremia, symptomatic, medical management


   Do you want consulting provider notified?: Yes











Primary care physician: 


Stated None





Hospital Course: 





Discharge diagnosis


1.  Morbid obesity status post Robotic assisted daVinci Xi laparoscopic sleeve 

gastrectomy and Intraoperative esophagogastroduodenoscopy


2.  Hypovolemic hyponatremia resolved


3.  Hypomagnesemia





Hospital course





Ovi Olivarez is a 55-year-old male who comes with lifelong morbid obesity.  He 

initially scheduled for sleeve gastrectomy on 09/21/2020 with Dr. Panda.  

Surgery had to be held due to hyponatremia.  Hyponatremia resolved after being 

treated with IV fluids, restarting diet and holding patient's diuretics.  

Patient was then able to proceed with Robotic assisted daVinci Xi laparoscopic 

sleeve gastrectomy and Intraoperative esophagogastroduodenoscopy on 09/23/2020. 

Patient tolerated surgery well.  He is tolerating diet.  Pain is controlled.  He

is up and ambulating.  He is afebrile.  He is stable for discharge.





Physician Assistant note has been reviewed by physician. Signing provider agrees

with the documented findings, assessment, and plan of care. 








Patient Condition at Discharge: Stable





Plan - Discharge Summary


New Discharge Prescriptions: 


New


   Acetaminophen Oral Susp (Peds) [Tylenol Oral Susp For Peds (Grape)] 1,000 mg 

PO Q6H PRN #360 ml


     PRN Reason: Pain


   bisacodyL [Dulcolax] 5 mg PO DAILY PRN #10 tablet.dr


     PRN Reason: Constipation


   Simethicone 40 mg/0.6 ml Drops [Mylicon Drops] 40 mg PO PCHS PRN #30 ml


     PRN Reason: Gas


   Omeprazole [PriLOSEC] 40 mg PO DAILY #30 capsule.


   Ondansetron Odt [Zofran Odt] 4 mg PO Q8HR PRN #9 tab


     PRN Reason: Nausea





Continue


   Triamterene-Hctz 37.5-25Mg [Dyazide 37.5-25 Capsule] 1 cap PO DAILY


   Losartan Potassium [Cozaar] 100 mg PO DAILY





Discontinued


   Valerian Root 300 mg PO HS


   Charito 500 mg PO DAILY


   Cider Vinegar [Apple Cider Vinegar] 300 mg PO DAILY


   Multivit-Min/FA/Lycopen/Lutein [Centrum Silver Tablet] 1 each PO DAILY


   Aspirin [Adult Low Dose Aspirin EC] 81 mg PO DAILY


   Turmeric Root Extract [Turmeric] 1,000 mg PO BID


   Omeprazole [PriLOSEC] 40 mg PO DAILY


   Ferrous Sulfate [Iron (65 MG Elemental)] 325 mg PO DAILY


   Cetirizine HCl [Zyrtec] 10 mg PO DAILY PRN


     PRN Reason: alleriges


   Naproxen 500 mg PO BID


Discharge Medication List





Triamterene-Hctz 37.5-25Mg [Dyazide 37.5-25 Capsule] 1 cap PO DAILY 08/07/19 

[History]


Losartan Potassium [Cozaar] 100 mg PO DAILY 10/02/19 [History]


Acetaminophen Oral Susp (Peds) [Tylenol Oral Susp For Peds (Grape)] 1,000 mg PO 

Q6H PRN #360 ml 09/25/20 [Rx]


Omeprazole [PriLOSEC] 40 mg PO DAILY #30 capsule. 09/25/20 [Rx]


Ondansetron Odt [Zofran Odt] 4 mg PO Q8HR PRN #9 tab 09/25/20 [Rx]


Simethicone 40 mg/0.6 ml Drops [Mylicon Drops] 40 mg PO PCHS PRN #30 ml 09/25/20

[Rx]


bisacodyL [Dulcolax] 5 mg PO DAILY PRN #10 tablet. 09/25/20 [Rx]








Follow up Appointment(s)/Referral(s): 


[facility] [[facility] - 09/30/20


Activity/Diet/Wound Care/Special Instructions: 





No lifting over 10 pounds


You may shower. No soaking or tub baths until October 9th


Very light activity until you are reevaluated at your follow up appointment with

your surgeon


Wear abdominal binder for comfort


Crush or cut all pills to a size smaller than a tic-tac


No straws or carbonated beverages


Discharge Disposition: HOME SELF-CARE

## 2020-09-25 NOTE — P.PN
Subjective


Progress Note Date: 09/25/20





Patient was seen and examined.  No acute events overnight.  Tolerating clear 

liquid diet well.  Passing gas.  Urinating freely.  Has no complaints.





Objective





- Vital Signs


Vital signs: 


                                   Vital Signs











Temp  98.2 F   09/25/20 07:00


 


Pulse  72   09/25/20 11:20


 


Resp  18   09/25/20 07:00


 


BP  145/65   09/25/20 07:00


 


Pulse Ox  92 L  09/25/20 08:15








                                 Intake & Output











 09/24/20 09/25/20 09/25/20





 18:59 06:59 18:59


 


Intake Total 400  


 


Balance 400  


 


Weight 174.6 kg  


 


Intake:   


 


  Oral 400  


 


Other:   


 


  Voiding Method  Toilet 


 


  # Voids 2 2 














- Exam





General: [non toxic], [no distress], [appears at stated age], morbidly obese


Derm: [warm], [dry]


Head: [atraumatic], [normocephalic], [symmetric]


Eyes: [EOMI], [no lid lag], [anicteric sclera]


Mouth: [no lip lesion], [mucus membranes moist]


Cardiovascular: [S1S2 reg], [no murmur], [positive posterior tibial pulse bila

teral], 


Lungs: [CTA bilateral], [no rhonchi, no rales] , [no accessory muscle use]


Abdominal: [soft], [ nontender to palpation], [no guarding], [no appreciable 

organomegaly]


Ext: [no gross muscle atrophy], [no edema], [no contractures]


Neuro:  [no focal neuro deficits]


Psych: [Alert], [oriented], [appropriate affect] 








- Labs


CBC & Chem 7: 


                                 09/25/20 07:00





                                 09/24/20 05:56


Labs: 


                  Abnormal Lab Results - Last 24 Hours (Table)











  09/25/20 Range/Units





  07:00 


 


RBC  4.14 L  (4.30-5.90)  m/uL


 


Hgb  11.7 L  (13.0-17.5)  gm/dL


 


Hct  34.9 L  (39.0-53.0)  %














Assessment and Plan


Assessment: 





Elevated liver enzymes


Hypertension


GERD


Morbid obesity with BMI 56.8


Resolved: Hyponatremia





His elevated liver enzymes is likely related to fatty liver and his LFT will be 

repeated tomorrow morning.  





Patient will be started on amlodipine by mouth.  Triamterene and hydrochlorothia

zide has been restarted.  Losartan restarted.  His vital signs are currently 

stable and will be watched, medications adjusted if necessary.  





Continue Protonix for GERD.  





General surgery on board for sleeve gastrectomy.  He is on Lovenox for DVT 

prophylaxis.





His hyponatremia has improved.  Patient is medically cleared for discharge.





Thank you for this consultation.  We will continue to follow this patient 

through his clinical course.  Please call sound physicians with any additional 

questions or concerns.

## 2020-09-28 ENCOUNTER — HOSPITAL ENCOUNTER (OUTPATIENT)
Dept: HOSPITAL 47 - PROCWHC3 | Age: 55
Discharge: HOME | End: 2020-09-28
Attending: SURGERY
Payer: COMMERCIAL

## 2020-09-28 VITALS
DIASTOLIC BLOOD PRESSURE: 87 MMHG | RESPIRATION RATE: 16 BRPM | TEMPERATURE: 97.9 F | HEART RATE: 61 BPM | SYSTOLIC BLOOD PRESSURE: 175 MMHG

## 2020-09-28 DIAGNOSIS — E86.0: Primary | ICD-10-CM

## 2020-09-28 LAB
ANION GAP SERPL CALC-SCNC: 5 MMOL/L
BASOPHILS # BLD AUTO: 0.1 K/UL (ref 0–0.2)
BASOPHILS NFR BLD AUTO: 1 %
BUN SERPL-SCNC: 12 MG/DL (ref 9–20)
CALCIUM SPEC-MCNC: 9.1 MG/DL (ref 8.4–10.2)
CHLORIDE SERPL-SCNC: 98 MMOL/L (ref 98–107)
CO2 SERPL-SCNC: 32 MMOL/L (ref 22–30)
EOSINOPHIL # BLD AUTO: 0.2 K/UL (ref 0–0.7)
EOSINOPHIL NFR BLD AUTO: 4 %
ERYTHROCYTE [DISTWIDTH] IN BLOOD BY AUTOMATED COUNT: 4.97 M/UL (ref 4.3–5.9)
ERYTHROCYTE [DISTWIDTH] IN BLOOD: 13.2 % (ref 11.5–15.5)
GLUCOSE SERPL-MCNC: 92 MG/DL (ref 74–99)
HCT VFR BLD AUTO: 42 % (ref 39–53)
HGB BLD-MCNC: 13.4 GM/DL (ref 13–17.5)
LYMPHOCYTES # SPEC AUTO: 0.9 K/UL (ref 1–4.8)
LYMPHOCYTES NFR SPEC AUTO: 17 %
MCH RBC QN AUTO: 26.9 PG (ref 25–35)
MCHC RBC AUTO-ENTMCNC: 31.9 G/DL (ref 31–37)
MCV RBC AUTO: 84.4 FL (ref 80–100)
MONOCYTES # BLD AUTO: 0.4 K/UL (ref 0–1)
MONOCYTES NFR BLD AUTO: 8 %
NEUTROPHILS # BLD AUTO: 3.7 K/UL (ref 1.3–7.7)
NEUTROPHILS NFR BLD AUTO: 69 %
PLATELET # BLD AUTO: 338 K/UL (ref 150–450)
POTASSIUM SERPL-SCNC: 4 MMOL/L (ref 3.5–5.1)
SODIUM SERPL-SCNC: 135 MMOL/L (ref 137–145)
WBC # BLD AUTO: 5.3 K/UL (ref 3.8–10.6)

## 2020-09-28 PROCEDURE — 96360 HYDRATION IV INFUSION INIT: CPT

## 2020-09-28 PROCEDURE — 80048 BASIC METABOLIC PNL TOTAL CA: CPT

## 2020-09-28 PROCEDURE — 36415 COLL VENOUS BLD VENIPUNCTURE: CPT

## 2020-09-28 PROCEDURE — 96361 HYDRATE IV INFUSION ADD-ON: CPT

## 2020-09-28 PROCEDURE — 85025 COMPLETE CBC W/AUTO DIFF WBC: CPT

## 2020-09-28 RX ADMIN — CEFAZOLIN SCH MLS/HR: 330 INJECTION, POWDER, FOR SOLUTION INTRAMUSCULAR; INTRAVENOUS at 12:50

## 2020-09-28 RX ADMIN — CEFAZOLIN SCH MLS/HR: 330 INJECTION, POWDER, FOR SOLUTION INTRAMUSCULAR; INTRAVENOUS at 13:30

## 2020-10-21 ENCOUNTER — HOSPITAL ENCOUNTER (INPATIENT)
Dept: HOSPITAL 47 - 4SSUR | Age: 55
LOS: 2 days | Discharge: HOME | DRG: 683 | End: 2020-10-23
Payer: COMMERCIAL

## 2020-10-21 ENCOUNTER — HOSPITAL ENCOUNTER (OUTPATIENT)
Dept: HOSPITAL 47 - PEDOP | Age: 55
Discharge: OTHER INSTITUTION | End: 2020-10-21
Payer: COMMERCIAL

## 2020-10-21 ENCOUNTER — HOSPITAL ENCOUNTER (OUTPATIENT)
Dept: HOSPITAL 47 - BARWHC3 | Age: 55
Discharge: HOME | End: 2020-10-21
Attending: SURGERY
Payer: COMMERCIAL

## 2020-10-21 VITALS
TEMPERATURE: 97.9 F | SYSTOLIC BLOOD PRESSURE: 102 MMHG | HEART RATE: 79 BPM | RESPIRATION RATE: 20 BRPM | DIASTOLIC BLOOD PRESSURE: 68 MMHG

## 2020-10-21 VITALS
RESPIRATION RATE: 20 BRPM | SYSTOLIC BLOOD PRESSURE: 102 MMHG | TEMPERATURE: 97.9 F | HEART RATE: 79 BPM | DIASTOLIC BLOOD PRESSURE: 68 MMHG

## 2020-10-21 VITALS — BODY MASS INDEX: 54.5 KG/M2

## 2020-10-21 DIAGNOSIS — F41.9: ICD-10-CM

## 2020-10-21 DIAGNOSIS — M47.9: ICD-10-CM

## 2020-10-21 DIAGNOSIS — E86.0: ICD-10-CM

## 2020-10-21 DIAGNOSIS — E86.0: Primary | ICD-10-CM

## 2020-10-21 DIAGNOSIS — Z86.718: ICD-10-CM

## 2020-10-21 DIAGNOSIS — K29.50: ICD-10-CM

## 2020-10-21 DIAGNOSIS — D50.9: ICD-10-CM

## 2020-10-21 DIAGNOSIS — M17.0: ICD-10-CM

## 2020-10-21 DIAGNOSIS — Z98.84: ICD-10-CM

## 2020-10-21 DIAGNOSIS — I82.403: ICD-10-CM

## 2020-10-21 DIAGNOSIS — K21.9: ICD-10-CM

## 2020-10-21 DIAGNOSIS — Z87.891: ICD-10-CM

## 2020-10-21 DIAGNOSIS — E55.9: ICD-10-CM

## 2020-10-21 DIAGNOSIS — K29.30: ICD-10-CM

## 2020-10-21 DIAGNOSIS — F32.9: ICD-10-CM

## 2020-10-21 DIAGNOSIS — Z71.3: ICD-10-CM

## 2020-10-21 DIAGNOSIS — Z88.5: ICD-10-CM

## 2020-10-21 DIAGNOSIS — Z79.1: ICD-10-CM

## 2020-10-21 DIAGNOSIS — H81.09: ICD-10-CM

## 2020-10-21 DIAGNOSIS — N17.9: Primary | ICD-10-CM

## 2020-10-21 DIAGNOSIS — Z79.899: ICD-10-CM

## 2020-10-21 DIAGNOSIS — E87.1: ICD-10-CM

## 2020-10-21 DIAGNOSIS — E86.1: ICD-10-CM

## 2020-10-21 DIAGNOSIS — T50.2X5A: ICD-10-CM

## 2020-10-21 DIAGNOSIS — I11.9: ICD-10-CM

## 2020-10-21 DIAGNOSIS — Z82.61: ICD-10-CM

## 2020-10-21 DIAGNOSIS — E66.01: ICD-10-CM

## 2020-10-21 DIAGNOSIS — Z79.82: ICD-10-CM

## 2020-10-21 DIAGNOSIS — K29.00: ICD-10-CM

## 2020-10-21 DIAGNOSIS — K44.9: ICD-10-CM

## 2020-10-21 DIAGNOSIS — E66.01: Primary | ICD-10-CM

## 2020-10-21 DIAGNOSIS — K22.10: ICD-10-CM

## 2020-10-21 DIAGNOSIS — K59.00: ICD-10-CM

## 2020-10-21 DIAGNOSIS — N17.9: ICD-10-CM

## 2020-10-21 DIAGNOSIS — Z88.8: ICD-10-CM

## 2020-10-21 LAB
ANION GAP SERPL CALC-SCNC: 12 MMOL/L
BUN SERPL-SCNC: 68 MG/DL (ref 9–20)
CALCIUM SPEC-MCNC: 9.5 MG/DL (ref 8.4–10.2)
CHLORIDE SERPL-SCNC: 96 MMOL/L (ref 98–107)
CO2 SERPL-SCNC: 24 MMOL/L (ref 22–30)
GLUCOSE SERPL-MCNC: 100 MG/DL (ref 74–99)
POTASSIUM SERPL-SCNC: 3.7 MMOL/L (ref 3.5–5.1)
SODIUM SERPL-SCNC: 132 MMOL/L (ref 137–145)

## 2020-10-21 PROCEDURE — 43239 EGD BIOPSY SINGLE/MULTIPLE: CPT

## 2020-10-21 PROCEDURE — 99211 OFF/OP EST MAY X REQ PHY/QHP: CPT

## 2020-10-21 PROCEDURE — 88305 TISSUE EXAM BY PATHOLOGIST: CPT

## 2020-10-21 PROCEDURE — 81003 URINALYSIS AUTO W/O SCOPE: CPT

## 2020-10-21 PROCEDURE — 80048 BASIC METABOLIC PNL TOTAL CA: CPT

## 2020-10-21 PROCEDURE — 96360 HYDRATION IV INFUSION INIT: CPT

## 2020-10-21 PROCEDURE — 85025 COMPLETE CBC W/AUTO DIFF WBC: CPT

## 2020-10-21 PROCEDURE — 80053 COMPREHEN METABOLIC PANEL: CPT

## 2020-10-21 PROCEDURE — 83735 ASSAY OF MAGNESIUM: CPT

## 2020-10-21 RX ADMIN — CEFAZOLIN SCH MLS/HR: 330 INJECTION, POWDER, FOR SOLUTION INTRAMUSCULAR; INTRAVENOUS at 22:12

## 2020-10-21 NOTE — P.PN
Subjective


Progress Note Date: 10/21/20








DATE OF SERVICE: 10/21/2020





CHIEF COMPLAINT: Status post sleeve gastrectomy





HISTORY OF PRESENT ILLNESS:  Ovi Olivarez is a 55-year-old male status post 

sleeve gastrectomy, 09/23/20. He is 1 month out. He reports pain with eating and

drinking. He reports epigastric pain. He can take his pills. His pills do not 

get stuck. He reports thick mucus when he wakes up. He has history of low 

sodium. 





At height of 5 feet 7 inches, his ideal body weight is 158 pounds.   His highest

weight was 415 pounds. His highest BMI was 65.1. He comes in 347 pounds from 374

pounds, 1 month ago. He has lost 27 pounds in 1 month. Lifetime weight loss of 

68 pounds. Percent excess weight loss lifetime, 26%. His body mass index is 

54.5.  He is 189  pounds overweight.





PHYSICAL EXAM: 


VITAL SIGNS: Height 5 foot 7 inches, weight 347 pounds. BMI 54.5


                                   Vital Signs











Temp  97.9 F   10/21/20 16:26


 


Pulse  79   10/21/20 16:26


 


Resp  20   10/21/20 16:26


 


BP  102/68   10/21/20 16:26


 


Pulse Ox  97   10/21/20 16:26











GENERAL: Well-developed in no acute distress.  


HEENT: No scleral icterus.  Extraocular movements grossly intact.  Hears 

conversational speech.  No nasal drainage.


NECK: Supple without lymphadenopathy. 


CHEST: Nonlabored respirations with equal bilateral excursions. 


CARDIOVASCULAR: Regular rate and regular rhythm. Distal 2+ pulses. 


ABDOMEN: Incisions intact. No infection


MUSCULOSKELETAL: No clubbing, cyanosis. Decreased edema of the lower extremities


NEURO: No focal or lateralizing signs. Cranial nerves 2 through 12 grossly 

within normal limits. 


PSYCH: Appropriate affect. Alert and oriented to person, place and time.


SKIN: Good skin turgor.  Well perfused.





ASSESSMENT: 


1.  Morbid obesity due to excess calories


2.  Body mass index of 65.1, initial to 54.5


3.  Osteoarthritis of the knees.


4.  Osteoarthritis of the lower back.


5.  Hypertensive heart disease.


6.  Gastroesophageal reflux disease


7.  DVT legs


8.  Meniere's disease


9.  Depressive disorder


10.  Iron deficiency anemia


11.  Vitamin D deficiency


12.  Chronic gastritis


13.  Dietary surveillance and counseling. 


14.  Status post sleeve gastrectomy


15.  Recent acute kidney injury due to dehydration





PLAN:


1.  Recommend IV fluids for pre-existing history of dehydration and chronic 

kidney disease. 


2.  Recommend upper endoscopy as needed for dysphagia 


3.  Recommend bariatric labs.

## 2020-10-21 NOTE — P.GSHP
History of Present Illness


H&P Date: 10/21/20











CHIEF COMPLAINT: Acute renal failure





HISTORY OF PRESENT ILLNESS:  Ovi Olivarez is a 55-year-old male with 

comorbidities of hypertensive heart disease, morbid obesity, bilateral lower 

extremity venous stasis disease and recent sleeve gastrectomy 2020.  He is

1 month out.  He reports developing generalized weakness including nausea and 

epigastric abdominal pain.  On his previous hospitalization, he had pre-existing

history of hyponatremia.  He was doing well until one week ago when he reports 

worsening weakness and inability to tolerate orals.  He continued to take his 

diuretic including antihypertensive medications.  He came to the bariatric 

center for his 1 month follow-up.  With his concerns, additional lab work was 

obtained.  He was being given IV fluids for hydration.  Blood work came back 

consistent with hyponatremia including new acute renal failure of 2.32 

creatinine from his baseline 0.6.  As a result, patient is admitted for acute 

renal failure.  He continues to urinate however dark.





At height of 5 feet 7 inches, his ideal body weight is 158 pounds.   His highest

weight was 415 pounds. His highest BMI was 65.1. He comes in 347 pounds from 

386, 1 month ago. He lost 38 pounds in 1 montn.  His body mass index is 54.6.  

He is 190 pounds overweight.





PAST MEDICAL HISTORY: 


1.  Morbid obesity due to excess calories


2.  Body mass index of 65.1, initial


3.  Osteoarthritis of the knees.


4.  Osteoarthritis of the lower back.


5.  Hypertensive heart disease.


6.  Gastroesophageal reflux disease


7.  DVT legs


8.  Meniere's disease


9.  Depressive disorder





PAST SURGICAL HISTORY: 


1. Varicose vein stripping


2. Umbilical hernia repair


3. Stents bilateral common iliac vein


4.  Status post sleeve gastrectomy





HOME MEDICATIONS: 


                                Home Medications











 Medication  Instructions  Recorded  Confirmed


 


Anxiety/Stress [employer] 1 tab PO HS 19


 


[place] Hemp Oil 20 drop PO BID 19


 


Aspirin [Adult Low Dose Aspirin EC] 81 mg PO DAILY 19


 


Cider Vinegar [Apple Cider Vinegar] 300 mg PO DAILY 19


 


Charito 500 mg PO BID 19


 


Glucosamine Sulfate 1,500 mg PO DAILY 19


 


Meloxicam [Mobic] 15 mg PO DAILY 19


 


Multivit-Min/FA/Lycopen/Lutein 1 each PO DAILY 19





[Centrum Silver Tablet]   


 


Omeprazole [PriLOSEC] 40 mg PO DAILY 19


 


Triamterene-Hctz 37.5-25Mg 1 cap PO DAILY 19





[Dyazide 37.5-25 Capsule]   


 


Turmeric Root Extract [Turmeric] 1,000 mg PO BID 19


 


Valerian Root 300 mg PO HS 19











ALLERGIES:


                                    Allergies











Allergy/AdvReac Type Severity Reaction Status Date / Time


 


morphine Allergy  Itching Verified 19 16:05











SOCIAL HISTORY: Past tobacco use.   





FAMILY HISTORY: No family history of ulcerative colitis disease or Crohn's 

disease. Family history of morbid obesity. No lupus in the family.  No reports 

of stomach or esophageal cancer.  





REVIEW OF ORGAN SYSTEMS: 


CONSTITUTIONAL: At height of 5 feet 7 inches, his ideal body weight is 158 

pounds.   His highest weight was 415 pounds. His highest BMI was 65.1. 


HEENT: Denies any active troubles with vision or hearing.  


ENDOCRINE: No diabetes. No hypothyroidism. 


CARDIOVASCULAR: No past reports of palpitations or heart attacks or chest pain. 




RESPIRATORY: Denies daytime somnolence. No asthma. 


GASTROINTESTINAL: Denies any bright red blood per rectum.  No diarrhea. No 

constipation.


MUSCULOSKELETAL: Has lower back pain and joint pain.  Has osteoarthritis of the 

knees.  History of bilateral lower extremity edema.


NEURO: No headaches. No seizure disorders. 


PSYCH: Has depression. No suicidal ideation.   


RHEUMATOLOGIC: No lupus.  No rheumatoid arthritis.  


HEMATOLOGIC: Denies any abnormal bleeding or bruising.  Personal history of 

DVTs.


SKIN: No rash.  No skin cancer.


GENITOURINARY: Has decreased urine output.  No blood in urine.





PHYSICAL EXAM: 


VITAL SIGNS: Height 5 foot 7 inches, weight 348 pounds. BMI 54.6





GENERAL: Well-developed in no acute distress.  


HEENT: No scleral icterus.  Extraocular movements grossly intact.  Hears 

conversational speech.  No nasal drainage.


NECK: Supple without lymphadenopathy. 


CHEST: Nonlabored respirations with equal bilateral excursions. 


CARDIOVASCULAR: Regular rate and regular rhythm. Distal 2+ pulses. 


ABDOMEN: Obese, soft, nontender, nondistended. 


MUSCULOSKELETAL: No clubbing, cyanosis. 


NEURO: No focal or lateralizing signs. Cranial nerves 2 through 12 grossly withi

n normal limits. 


PSYCH: Appropriate affect. Alert and oriented to person, place and time.


SKIN: Good skin turgor.  Well perfused.





LABS: Creatinine elevated from 0.62 to 2.32.  Sodium low 132





ASSESSMENT: 


1.  Morbid obesity due to excess calories


2.  Body mass index of 65.1 to 54.6


3.  Osteoarthritis of the knees.


4.  Osteoarthritis of the lower back.


5.  Hypertensive heart disease.


6.  Gastroesophageal reflux disease


7.  DVT legs


8.  Meniere's disease


9.  Depressive disorder


10.  Iron deficiency anemia


11.  Vitamin D deficiency


12.  Chronic gastritis


13.  Acute renal failure


14.  Hyponatremia 


15.  Dehydration 


16.  Epigastric abdominal pain








PLAN: 


1.  Recommend full inpatient admission for acute renal failure


2.  IV fluid hydration for severe dehydration due to diuretics.


3.  Recommend upper endoscopy for epigastric abdominal pain including risk for 

gastric ulcers


4.  Discontinue hypertensive medications included diuretic and Cozaar


5.  Repeat CBC and BMP


6.  Will need nephrology consultation





Past Medical History


Past Medical History: GERD/Reflux, Hypertension, Osteoarthritis (OA)


Additional Past Medical History / Comment(s): Meniere's disease (Takes Dyazide 

for this condition), LEFT LEG DVT; Left shoulder pain "bone on bone", varicose 

veins, "irritation in stomach", hx gout, LEFT LEG GANGRENE


History of Any Multi-Drug Resistant Organisms: None Reported


Past Surgical History: Bariatric Surgery, Hernia Repair


Additional Past Surgical History / Comment(s): Left leg varicose vein stripping 

and ablation and sclerotherapy (3 times), Right leg vein stripping and 

sclerotherapy,  umbilical hernia repair with mesh, stents placed in bilateral 

exteriror iliac veins and common iliac veins (stents were placed through 

incisions on back), GASTRIC BYPASS SLEEVE.


Past Anesthesia/Blood Transfusion Reactions: No Reported Reaction


Additional Past Anesthesia/Blood Transfusion Reaction / Comment(s): No history 

of blood transfusions to date


Past Psychological History: Depression


Additional Psychological History / Comment(s): 19: Pt states he takes herbal

supplements (had been given an antidepressment but it made him deathly sick, was

given another med to try but was fearful and so has been doing well on his 

herbal supplements)


Smoking Status: Former smoker


Past Alcohol Use History: Occasional


Additional Past Alcohol Use History / Comment(s): STARTED SMOKING AT AGE 13 QUIT

ON AND OFF LAST QUIT AT AGE 39 SMOKED 3/4 - 1PPD


Past Drug Use History: None Reported


Additional Drug Use History / Comment(s): uses hemp oil





- Past Family History


  ** Mother


Family Medical History: No Reported History


Additional Family Medical History / Comment(s): .





  ** Father


Family Medical History: Osteoarthritis (OA)


Additional Family Medical History / Comment(s):  at age 78





Medications and Allergies


                                Home Medications











 Medication  Instructions  Recorded  Confirmed  Type


 


Ondansetron Odt [Zofran ODT] 4 mg PO Q8HR PRN #9 tab 09/25/20 10/22/20 Rx


 


Omeprazole 40 mg PO HS 10/21/20 10/22/20 History


 


Acetaminophen Tab [Tylenol Tab] 1,000 mg PO Q6HR PRN #30 tablet 10/23/20  Rx


 


Sucralfate [Carafate] 1 gm PO BID #30 tablet 10/23/20  Rx


 


amLODIPine [Norvasc] 10 mg PO DAILY #30 tablet 10/23/20  Rx


 


polyethylene glycoL 3350 [Miralax] 17 gm PO BID #60 packet 10/23/20  Rx








                                    Allergies











Allergy/AdvReac Type Severity Reaction Status Date / Time


 


morphine Allergy  Itching/ Verified 10/21/20 19:02


 


nortriptyline Allergy  NAUSEA , Verified 10/21/20 19:02





   HEADACHE  














Results





- Labs





                                 10/22/20 07:25





                                 10/23/20 07:33





Assessment and Plan


(1) Acute kidney injury


Status: Acute   Code(s): N17.9 - ACUTE KIDNEY FAILURE, UNSPECIFIED   SNOMED 

Code(s): 07859308


   





(2) Adult BMI 50.0-59.9 kg/sq m


Status: Acute   Code(s): Z68.43 - BODY MASS INDEX [BMI] 50.0-59.9, ADULT   

SNOMED Code(s): 013442653


   





(3) Hypertensive heart disease


Status: Acute   Code(s): I11.9 - HYPERTENSIVE HEART DISEASE WITHOUT HEART F

AILURE   SNOMED Code(s): 03286313


   





(4) Hyponatremia


Status: Acute   Code(s): E87.1 - HYPO-OSMOLALITY AND HYPONATREMIA   SNOMED 

Code(s): 73442792


   





(5) Morbid obesity due to excess calories


Status: Acute   Code(s): E66.01 - MORBID (SEVERE) OBESITY DUE TO EXCESS CALORIES

   SNOMED Code(s): 866554436

## 2020-10-22 LAB
ALBUMIN SERPL-MCNC: 3.9 G/DL (ref 3.8–4.9)
ALBUMIN/GLOB SERPL: 1.44 G/DL (ref 1.6–3.17)
ALP SERPL-CCNC: 52 U/L (ref 41–126)
ALT SERPL-CCNC: 47 U/L (ref 10–49)
ANION GAP SERPL CALC-SCNC: 11.6 MMOL/L (ref 4–12)
AST SERPL-CCNC: 42 U/L (ref 14–35)
BASOPHILS # BLD AUTO: 0.1 K/UL (ref 0–0.2)
BASOPHILS NFR BLD AUTO: 1 %
BUN SERPL-SCNC: 55 MG/DL (ref 9–27)
BUN/CREAT SERPL: 32.35 RATIO (ref 12–20)
CALCIUM SPEC-MCNC: 9.1 MG/DL (ref 8.7–10.3)
CHLORIDE SERPL-SCNC: 100 MMOL/L (ref 96–109)
CO2 SERPL-SCNC: 26.4 MMOL/L (ref 21.6–31.8)
EOSINOPHIL # BLD AUTO: 0.1 K/UL (ref 0–0.7)
EOSINOPHIL NFR BLD AUTO: 3 %
ERYTHROCYTE [DISTWIDTH] IN BLOOD BY AUTOMATED COUNT: 4.98 M/UL (ref 4.3–5.9)
ERYTHROCYTE [DISTWIDTH] IN BLOOD: 13.3 % (ref 11.5–15.5)
GLOBULIN SER CALC-MCNC: 2.7 G/DL (ref 1.6–3.3)
GLUCOSE SERPL-MCNC: 88 MG/DL (ref 70–110)
HCT VFR BLD AUTO: 43 % (ref 39–53)
HGB BLD-MCNC: 13.7 GM/DL (ref 13–17.5)
LYMPHOCYTES # SPEC AUTO: 1.3 K/UL (ref 1–4.8)
LYMPHOCYTES NFR SPEC AUTO: 26 %
MCH RBC QN AUTO: 27.5 PG (ref 25–35)
MCHC RBC AUTO-ENTMCNC: 31.8 G/DL (ref 31–37)
MCV RBC AUTO: 86.5 FL (ref 80–100)
MONOCYTES # BLD AUTO: 0.5 K/UL (ref 0–1)
MONOCYTES NFR BLD AUTO: 10 %
NEUTROPHILS # BLD AUTO: 2.9 K/UL (ref 1.3–7.7)
NEUTROPHILS NFR BLD AUTO: 58 %
PH UR: 5 [PH] (ref 5–8)
PLATELET # BLD AUTO: 219 K/UL (ref 150–450)
POTASSIUM SERPL-SCNC: 4.1 MMOL/L (ref 3.5–5.5)
PROT SERPL-MCNC: 6.6 G/DL (ref 6.2–8.2)
SODIUM SERPL-SCNC: 138 MMOL/L (ref 135–145)
SP GR UR: 1.01 (ref 1–1.03)
UROBILINOGEN UR QL STRIP: <2 MG/DL (ref ?–2)
WBC # BLD AUTO: 5 K/UL (ref 3.8–10.6)

## 2020-10-22 PROCEDURE — 0DB78ZX EXCISION OF STOMACH, PYLORUS, VIA NATURAL OR ARTIFICIAL OPENING ENDOSCOPIC, DIAGNOSTIC: ICD-10-PCS

## 2020-10-22 RX ADMIN — ENOXAPARIN SODIUM SCH: 40 INJECTION SUBCUTANEOUS at 07:47

## 2020-10-22 RX ADMIN — CEFAZOLIN SCH MLS/HR: 330 INJECTION, POWDER, FOR SOLUTION INTRAMUSCULAR; INTRAVENOUS at 20:15

## 2020-10-22 RX ADMIN — CEFAZOLIN SCH MLS/HR: 330 INJECTION, POWDER, FOR SOLUTION INTRAMUSCULAR; INTRAVENOUS at 11:33

## 2020-10-22 RX ADMIN — CEFAZOLIN SCH: 330 INJECTION, POWDER, FOR SOLUTION INTRAMUSCULAR; INTRAVENOUS at 05:33

## 2020-10-22 NOTE — P.PCN
Date of Procedure: 10/22/20


Description of Procedure: 











PREOPERATIVE DIAGNOSIS:


Gastric ulcers


Status post sleeve gastrectomy.


Gastroesophageal reflux disease.


Epigastric abdominal pain.





POSTOPERATIVE DIAGNOSIS:


Status post sleeve gastrectomy.


Gastroesophageal reflux disease.


Epigastric abdominal pain.


Diaphragmatic hiatal hernia without obstruction.


Chronic superficial gastritis.





OPERATION:


Esophagogastroduodenoscopy with cold forceps biopsies along the antrum.





SURGEON: uSze Panda MD





ANESTHESIA: MAC.





INDICATIONS:


The patient is a 55-year-old male who presents with a history of sleeve 

gastrectomy with abdominal pain.   Benefits and risks of the procedure were 

described. Informed consent was obtained.





DESCRIPTION:


The patient was brought into the endoscopy suite and laid in the left lateral 

decubitus position. An Olympus gastroscope was passed along the posterior 

oropharynx down to the distal esophagus where the squamocolumnar junction was at

40 cm from the incisors. The stomach was entered with a diaphragmatic hiatus 

found at 43 cm. Retained gastric cardia was identified.  The sleeve reservoir 

was within normal limits.   Chronic gastritis albeit mild was found along the 

antrum with cold biopsies obtained.  The first through third portion of the 

duodenum was examined and unremarkable.  The stomach was desufflated. The 

patient tolerated the procedure well.





FINDINGS:


No acute ulceration found along her sleeve. 


No corkscrewing sleeve gastrectomy.


Squamocolumnar junction at 40 cm from the incisors.


Diaphragmatic hiatus at 43 cm.


Hiatal hernia 3 cm, fixed.


LA grade A erosive esophagitis.


No active duodenitis.


Acute on chronic gastritis.





RECOMMENDATIONS:


Upper endoscopy as needed.


Start Carafate


Full liquid diet.

## 2020-10-22 NOTE — P.NPCON
History of Present Illness





- Reason for Consult


acute renal failure





- History of Present Illness





Reason for consultation: Acute kidney injury





History of present illness:


Patient is a 55-year-old male seen in renal consultation for acute kidney 

injury.  Patient's creatinine was 2.3 on admission yesterday.  Labs from today 

are pending.  Patient presented to the hospital with generalized weakness as 

well as presyncopal symptoms.  Patient states he's been dizzy and lightheaded.  

His oral intake the last few days has been poor.  He denies vomiting or 

diarrhea.  Denies use of nonsteroidals.  No history of diabetes.  No hematuria 

or dysuria.  Patient states he had a cousin on hemodialysis but is unsure of the

etiology of his kidney disease.  He has history of gastric sleeve.  No edema.  

He was taking Dyazide as well as losartan at home which are both currently held.

 He is receiving IV fluids.  She states his urine output has improved overnight.

 No fever or chills.  No cough.  No other complaints.





Vital signs are stable.


General: The patient appeared well nourished and normally developed. 


HEENT: Head exam is unremarkable. Neck is without jugular venous distension.


LUNGS: Lungs are clear to auscultation and percussion. Breath sounds decreased.


HEART: Rate and Rhythm are regular. 


ABDOMEN: Soft, obese.


EXTREMITITES: No clubbing, cyanosis, or edema.





Past Medical History


Past Medical History: GERD/Reflux, Hypertension, Osteoarthritis (OA)


Additional Past Medical History / Comment(s): Meniere's disease (Takes Dyazide 

for this condition), LEFT LEG DVT; Left shoulder pain "bone on bone", varicose 

veins, "irritation in stomach", hx gout, LEFT LEG GANGRENE


History of Any Multi-Drug Resistant Organisms: None Reported


Past Surgical History: Bariatric Surgery, Hernia Repair


Additional Past Surgical History / Comment(s): Left leg varicose vein stripping 

and ablation and sclerotherapy (3 times), Right leg vein stripping and 

sclerotherapy,  umbilical hernia repair with mesh, stents placed in bilateral 

exteriror iliac veins and common iliac veins (stents were placed through 

incisions on back), GASTRIC BYPASS SLEEVE.


Past Anesthesia/Blood Transfusion Reactions: No Reported Reaction


Additional Past Anesthesia/Blood Transfusion Reaction / Comment(s): No history 

of blood transfusions to date


Past Psychological History: Depression


Additional Psychological History / Comment(s): 19: Pt states he takes herbal

supplements (had been given an antidepressment but it made him deathly sick, was

given another med to try but was fearful and so has been doing well on his 

herbal supplements)


Smoking Status: Former smoker


Past Alcohol Use History: Occasional


Additional Past Alcohol Use History / Comment(s): STARTED SMOKING AT AGE 13 QUIT

ON AND OFF LAST QUIT AT AGE 39 SMOKED 3/4 - 1PPD


Past Drug Use History: None Reported


Additional Drug Use History / Comment(s): uses hemp oil





- Past Family History


  ** Mother


Family Medical History: No Reported History


Additional Family Medical History / Comment(s): .





  ** Father


Family Medical History: Osteoarthritis (OA)


Additional Family Medical History / Comment(s):  at age 78





Medications and Allergies


                                Home Medications











 Medication  Instructions  Recorded  Confirmed  Type


 


Triamterene-Hctz 37.5-25Mg 1 cap PO DAILY 08/07/19 10/21/20 History





[Dyazide 37.5-25 Capsule]    


 


Losartan Potassium [Cozaar] 100 mg PO DAILY 10/02/19 10/21/20 History


 


Ondansetron Odt [Zofran Odt] 4 mg PO Q8HR PRN #9 tab 09/25/20 10/21/20 Rx


 


Omeprazole 40 mg PO HS 10/21/20 10/21/20 History








                                    Allergies











Allergy/AdvReac Type Severity Reaction Status Date / Time


 


morphine Allergy  Itching/ Verified 10/21/20 19:02


 


nortriptyline Allergy  NAUSEA , Verified 10/21/20 19:02





   HEADACHE  














Physical Exam


Vitals: 


                                   Vital Signs











  Temp Pulse Resp BP Pulse Ox


 


 10/22/20 07:44  97.8 F  56 L  17  130/81  98


 


 10/22/20 01:00  98.1 F  66  20  171/94  100


 


 10/22/20 00:00    18  


 


 10/21/20 20:00    18  


 


 10/21/20 19:30  98.3 F  79  20  159/96  100








                                Intake and Output











 10/21/20 10/22/20 10/22/20





 22:59 06:59 14:59


 


Intake Total  1040 


 


Balance  1040 


 


Intake:   


 


  Intake, IV Titration  1040 





  Amount   


 


    Sodium Chloride 0.9% 1,  0 





    000 ml @ 130 mls/hr IV .   





    Q7H42M [facility] Rx#:[id number]   


 


    Sodium Chloride 0.9% 2,   





    000 ml @ 999 mls/hr IV .   





    Q2H1M [facility] Rx#:[id number]   


 


Other:   


 


  Weight 158.18 kg  














Results





- Lab Results





                                 10/22/20 07:25








Assessment and Plan


Plan: 





Assessment:


1.  Acute kidney injury mostly prerenal secondary to hypovolemia from diuretics 

and poor intake.  Creatinine 2.3 on admission.  Baseline creatinine near 1.


2.  Benign hypertension.  Stable.


3.  History of gastric sleeve.


4.  Hypovolemic hyponatremia.





Plan:


Maintain IV fluids.


Follow-up morning labs.


Check urinalysis.


Avoid nephrotoxins.


Continue to monitor renal function and urine output.





Thank you for the consultation.  I will continue to follow the patient with you 

during his hospital stay.

## 2020-10-23 VITALS
TEMPERATURE: 97.6 F | DIASTOLIC BLOOD PRESSURE: 83 MMHG | SYSTOLIC BLOOD PRESSURE: 166 MMHG | HEART RATE: 62 BPM | RESPIRATION RATE: 18 BRPM

## 2020-10-23 LAB
ANION GAP SERPL CALC-SCNC: 10.6 MMOL/L (ref 4–12)
BUN SERPL-SCNC: 34 MG/DL (ref 9–27)
BUN/CREAT SERPL: 26.15 RATIO (ref 12–20)
CALCIUM SPEC-MCNC: 9.2 MG/DL (ref 8.7–10.3)
CHLORIDE SERPL-SCNC: 101 MMOL/L (ref 96–109)
CO2 SERPL-SCNC: 28.4 MMOL/L (ref 21.6–31.8)
GLUCOSE SERPL-MCNC: 88 MG/DL (ref 70–110)
MAGNESIUM SPEC-SCNC: 1.6 MG/DL (ref 1.5–2.4)
POTASSIUM SERPL-SCNC: 3.6 MMOL/L (ref 3.5–5.5)
SODIUM SERPL-SCNC: 140 MMOL/L (ref 135–145)

## 2020-10-23 RX ADMIN — CEFAZOLIN SCH: 330 INJECTION, POWDER, FOR SOLUTION INTRAMUSCULAR; INTRAVENOUS at 02:49

## 2020-10-23 RX ADMIN — ENOXAPARIN SODIUM SCH MG: 40 INJECTION SUBCUTANEOUS at 08:14

## 2020-10-23 NOTE — P.PN
Subjective





Patient is seen in follow-up for acute kidney injury.  Renal function improving 

with IV fluids.  Oral intake good.  No dizziness or syncopal episodes.  No 

vomiting or diarrhea.





Vital signs are stable.


General: The patient appeared well nourished and normally developed. 


HEENT: Head exam is unremarkable. Neck is without jugular venous distension.


LUNGS: Lungs are clear to auscultation and percussion. Breath sounds decreased.


HEART: Rate and Rhythm are regular. 


ABDOMEN: Soft, nontender.  Obese.


EXTREMITITES: No clubbing, cyanosis, or edema.





Objective





- Vital Signs


Vital signs: 


                                   Vital Signs











Temp  97.6 F   10/23/20 07:30


 


Pulse  62   10/23/20 07:30


 


Resp  18   10/23/20 08:48


 


BP  166/83   10/23/20 07:30


 


Pulse Ox  96   10/23/20 07:30








                                 Intake & Output











 10/22/20 10/23/20 10/23/20





 18:59 06:59 18:59


 


Intake Total 100 2280 


 


Balance 100 2280 


 


Intake:   


 


    


 


  Intake, IV Titration  2080 





  Amount   


 


    Sodium Chloride 0.9% 1,  2080 





    000 ml @ 130 mls/hr IV .   





    Q7H42M [facility] Rx#:[id number]   


 


  Oral  200 


 


Other:   


 


  Voiding Method Toilet Toilet Toilet


 


  # Voids 3 1 














- Labs


CBC & Chem 7: 


                                 10/22/20 07:25





                                 10/22/20 07:25


Labs: 


                  Abnormal Lab Results - Last 24 Hours (Table)











  10/22/20 Range/Units





  07:25 


 


BUN  55.0 H  (9.0-27.0)  mg/dL


 


Creatinine  1.7 H  (0.6-1.5)  mg/dL


 


Est GFR (CKD-EPI)AfAm  50.8 L  (60.0-200.0)   


 


Est GFR (CKD-EPI)NonAf  43.8 L  (60.0-200.0)   


 


BUN/Creatinine Ratio  32.35 H  (12.00-20.00)  Ratio


 


AST  42 H  (14-35)  U/L


 


Albumin/Globulin Ratio  1.44 L  (1.60-3.17)  g/dL














Assessment and Plan


Plan: 





Assessment:


1.  Acute kidney injury mostly prerenal secondary to hypovolemia from diuretics 

and poor intake.  Creatinine 2.3 on admission and down to 1.7 as of yesterday.  

Baseline creatinine near 1.  UA benign.


2.  Benign hypertension.  Stable.


3.  History of gastric sleeve.


4.  Hypovolemic hyponatremia.  Improved with IV hydration.





Plan:


Decrease normal saline to 50 mL an hour.


Avoid nephrotoxins.


Continue to monitor renal function and urine output.


Stable for discharge home from nephrology standpoint.  Follow up outpatient in 2

weeks.


Continue to stay off losartan and diuretics.


Add amlodipine.

## 2020-10-23 NOTE — P.DS
<Devika Corbett - Last Filed: 10/23/20 14:42>





Providers


Expected date of discharge: 10/23/20


Hospital Course: 





Discharge diagnosis





1.  Morbid obesity due to excess calories


2.  Body mass index of 65.1 to 60.6


3.  Osteoarthritis of the knees.


4.  Osteoarthritis of the lower back.


5.  Hypertensive heart disease.


6.  Gastroesophageal reflux disease


7.  DVT legs


8.  Meniere's disease


9.  Depressive disorder


10.  Iron deficiency anemia


11.  Vitamin D deficiency


12.  Chronic gastritis


13.  Acute kidney injury, Prerenal secondary to hypovolemia from diuretics and 

poor oral intake.


14.  Constipation


15.  Status post sleeve gastrectomy


16.  GERD


17.  Epigastric abdominal pain.


18.  Diaphragmatic hiatal hernia without obstruction.


19.  Chronic superficial gastritis.





Hospital course


Ovi Olivarez is a 55-year-old male with comorbidities of hypertensive heart 

disease, morbid obesity, bilateral lower extremity venous stasis disease and 

recent sleeve gastrectomy 09/23/2020.  He is 1 month out.  He reports developing

generalized weakness including nausea and epigastric abdominal pain.  His pre

vious hospitalization, he has pre-existing history of hyponatremia.  He was 

doing well until one week ago where he reports worsening weakness and inability 

to tolerate orals.  He continued to take his diuretic including antihypertensive

medications.  He came to the bariatric center for his 1 month follow-up.  With 

his concerns, additional lab work was obtained.  He was being given IV fluids 

for hydration.  Blood work came back consistent with hyponatremia including new 

acute renal failure of 2.32 creatinine from his baseline 0.6.  As a result, 

patient is admitted for acute renal failure.  Patient was treated with IV 

fluids.  His Diuretics and losartan have been discontinued.  His kidney function

has improved.  He was followed by nephrology during this admission.  Nephrology 

has cleared him for discharge. Patient is tolerating diet.  He was able to have 

a bowel movement. He denies any nausea or vomiting.  He's afebrile.  He has been

up and ambulating.  He is stable for discharge.





Physician Assistant note has been reviewed by physician. Signing provider agrees

with the documented findings, assessment, and plan of care. 


Patient Condition at Discharge: Stable





Plan - Discharge Summary


Discharge Rx Participant: Yes


New Discharge Prescriptions: 


New


   Sucralfate [Carafate] 1 gm PO BID #30 tablet


   amLODIPine [Norvasc] 10 mg PO DAILY #30 tablet


   Acetaminophen Tab [Tylenol Tab] 1,000 mg PO Q6HR PRN #30 tablet


     PRN Reason: Pain


   polyethylene glycoL 3350 [Miralax] 17 gm PO BID #60 packet





Continue


   Ondansetron Odt [Zofran ODT] 4 mg PO Q8HR PRN #9 tab


     PRN Reason: Nausea


   Omeprazole 40 mg PO HS





Discontinued


   Triamterene-Hctz 37.5-25Mg [Dyazide 37.5-25 Capsule] 1 cap PO DAILY


   Losartan Potassium [Cozaar] 100 mg PO DAILY


Discharge Medication List





Ondansetron Odt [Zofran ODT] 4 mg PO Q8HR PRN #9 tab 09/25/20 [Rx]


Omeprazole 40 mg PO HS 10/21/20 [History]


Acetaminophen Tab [Tylenol Tab] 1,000 mg PO Q6HR PRN #30 tablet 10/23/20 [Rx]


Sucralfate [Carafate] 1 gm PO BID #30 tablet 10/23/20 [Rx]


amLODIPine [Norvasc] 10 mg PO DAILY #30 tablet 10/23/20 [Rx]


polyethylene glycoL 3350 [Miralax] 17 gm PO BID #60 packet 10/23/20 [Rx]








Follow up Appointment(s)/Referral(s): 


[facility] [NON-STAFF] - 10/28/20 (Not answering.  Please call to

make appointment)


Elie Hinds DO [STAFF PHYSICIAN] - 2 Weeks (office not answering.  Please 

call to make appointment)


Patient Instructions/Handouts:  *[facility] - (Anesthesia) Endoscopy Discharge 

Instructions, Acute Kidney Injury (DC), Complete Blenderized Diet (DC)


Activity/Diet/Wound Care/Special Instructions: 


Pure diet recommended


Discharge Disposition: HOME SELF-CARE





<Suze Panda - Last Filed: 10/23/20 22:38>





Providers


Date of admission: 


10/21/20 17:45





Attending physician: 


Suze Panda





Consults: 





                                        





10/21/20 19:21


Consult Physician Routine 


   Consulting Provider: Radha Borrego


   Consult Reason/Comments: Acute renal failure


   Do you want consulting provider notified?: Yes











Primary care physician: 


Lucius Chaparro








- Discharge Diagnosis(es)


(1) Acute kidney injury


Status: Acute   





(2) Adult BMI 50.0-59.9 kg/sq m


Status: Acute   





(3) Hypertensive heart disease


Status: Acute   





(4) Hyponatremia


Status: Acute   





(5) Morbid obesity due to excess calories


Status: Acute   





(6) Constipation


Status: Acute   


Hospital Course: 








HISTORY OF PRESENT ILLNESS:  Ovi Olivarez is a 55-year-old male who presented 

with severe acute dehydration due to his chronic diuretic use.  Since admission,

he had moderate IV fluid boluses to correct his dehydration.  Nephrology 

consultation was obtained.  His antihypertensive medications were discontinued. 

Upper endoscopy confirmed no acute ulceration with his epigastric abdominal 

pain.  He reports pre-existing constipation present prior to admission.  He was 

placed on bowel regimen including lactulose, Colace including soapsuds enema and

fleets enema.  Prior to discharge, his dehydration was corrected.  He was 

tolerating diet.





PHYSICAL EXAM: 


VITAL SIGNS: Height 5 foot 7 inches, weight 348 pounds. BMI 54.6





GENERAL: Well-developed in no acute distress.  


HEENT: No scleral icterus.  Extraocular movements grossly intact.  Hears 

conversational speech.  No nasal drainage.


NECK: Supple without lymphadenopathy. 


CHEST: Nonlabored respirations with equal bilateral excursions. 


CARDIOVASCULAR: Regular rate and regular rhythm. Distal 2+ pulses. 


ABDOMEN: Obese, soft, nontender, nondistended. 


MUSCULOSKELETAL: No clubbing, cyanosis. 


NEURO: No focal or lateralizing signs. Cranial nerves 2 through 12 grossly 

within normal limits. 


PSYCH: Appropriate affect. Alert and oriented to person, place and time.


SKIN: Good skin turgor.  Well perfused.





LABS: Creatinine elevated from 0.62 to 2.32, down to 1.3.  Sodium low 132 now up

to 140. 





ASSESSMENT: 


1.  Morbid obesity due to excess calories


2.  Body mass index of 65.1 to 54.6


3.  Osteoarthritis of the knees.


4.  Osteoarthritis of the lower back.


5.  Hypertensive heart disease.


6.  Gastroesophageal reflux disease


7.  DVT legs


8.  Meniere's disease


9.  Depressive disorder


10.  Iron deficiency anemia


11.  Vitamin D deficiency


12.  Chronic gastritis


13.  Acute renal failure


14.  Hyponatremia 


15.  Dehydration 


16.  Epigastric abdominal pain








PLAN: 


1.  Per recommendations of nephrology, diuretic including closure discontinued. 

Patient started on amlodipine 10 mg daily.


2.  Discharge following bowel movements.


3.  Follow up in one week at the bariatric center

## 2020-10-28 ENCOUNTER — HOSPITAL ENCOUNTER (OUTPATIENT)
Dept: HOSPITAL 47 - BARWHC3 | Age: 55
Discharge: HOME | End: 2020-10-28
Attending: SURGERY
Payer: COMMERCIAL

## 2020-10-28 VITALS — TEMPERATURE: 98.3 F | HEART RATE: 68 BPM | SYSTOLIC BLOOD PRESSURE: 142 MMHG | DIASTOLIC BLOOD PRESSURE: 71 MMHG

## 2020-10-28 VITALS — BODY MASS INDEX: 55.1 KG/M2

## 2020-10-28 DIAGNOSIS — M17.0: ICD-10-CM

## 2020-10-28 DIAGNOSIS — K29.50: ICD-10-CM

## 2020-10-28 DIAGNOSIS — E86.0: ICD-10-CM

## 2020-10-28 DIAGNOSIS — H81.09: ICD-10-CM

## 2020-10-28 DIAGNOSIS — F32.9: ICD-10-CM

## 2020-10-28 DIAGNOSIS — N17.9: ICD-10-CM

## 2020-10-28 DIAGNOSIS — E66.01: Primary | ICD-10-CM

## 2020-10-28 DIAGNOSIS — Z98.84: ICD-10-CM

## 2020-10-28 DIAGNOSIS — Z71.3: ICD-10-CM

## 2020-10-28 DIAGNOSIS — M47.9: ICD-10-CM

## 2020-10-28 DIAGNOSIS — D50.9: ICD-10-CM

## 2020-10-28 DIAGNOSIS — I11.9: ICD-10-CM

## 2020-10-28 DIAGNOSIS — E55.9: ICD-10-CM

## 2020-10-28 DIAGNOSIS — I82.403: ICD-10-CM

## 2020-10-28 DIAGNOSIS — K21.9: ICD-10-CM

## 2020-10-28 PROCEDURE — 97803 MED NUTRITION INDIV SUBSEQ: CPT

## 2020-10-28 PROCEDURE — 99211 OFF/OP EST MAY X REQ PHY/QHP: CPT

## 2020-10-28 NOTE — P.PN
Subjective


Progress Note Date: 10/28/20








DATE OF SERVICE: 10/28/2020





CHIEF COMPLAINT: Status post sleeve gastrectomy





HISTORY OF PRESENT ILLNESS:  Ovi Olivarez is a 55-year-old male status post 

sleeve gastrectomy, 09/23/20. He is 1 month out. He reports feeling better. He 

is off his hypertensive medications. He is tolerating diet. He is adjusting his 

diet. He is follow up on his hospitalization. He takes dyazide.





At height of 5 feet 7 inches, his ideal body weight is 158 pounds.   His highest

weight was 415 pounds. His highest BMI was 65.1. He comes in 351 pounds from 347

pounds, 1 week ago. He has gained 4 pounds in 1 week. Lifetime weight loss of 64

pounds. Percent excess weight loss lifetime, 25%. His body mass index is 55.1.  

He is 193  pounds overweight.





PHYSICAL EXAM: 


VITAL SIGNS: Height 5 foot 7 inches, weight 351 pounds. BMI 55.1


                                   Vital Signs











Temp  98.3 F   10/28/20 12:15


 


Pulse  68   10/28/20 12:15


 


Resp      


 


BP  142/71   10/28/20 12:15


 


Pulse Ox      











GENERAL: Well-developed in no acute distress.  


HEENT: No scleral icterus.  Extraocular movements grossly intact.  Hears 

conversational speech.  No nasal drainage.


NECK: Supple without lymphadenopathy. 


CHEST: Nonlabored respirations with equal bilateral excursions. 


CARDIOVASCULAR: Regular rate and regular rhythm. Distal 2+ pulses. 


ABDOMEN: Incisions intact. No infection


MUSCULOSKELETAL: No clubbing, cyanosis. Decreased edema of the lower extremities


NEURO: No focal or lateralizing signs. Cranial nerves 2 through 12 grossly 

within normal limits. 


PSYCH: Appropriate affect. Alert and oriented to person, place and time.


SKIN: Good skin turgor.  Well perfused.





ASSESSMENT: 


1.  Morbid obesity due to excess calories


2.  Body mass index of 65.1, initial to 55.1


3.  Osteoarthritis of the knees.


4.  Osteoarthritis of the lower back.


5.  Hypertensive heart disease.


6.  Gastroesophageal reflux disease


7.  DVT legs


8.  Meniere's disease


9.  Depressive disorder


10.  Iron deficiency anemia


11.  Vitamin D deficiency


12.  Chronic gastritis


13.  Dietary surveillance and counseling. 


14.  Status post sleeve gastrectomy


15.  Recent acute kidney injury due to dehydration





PLAN:


1.   Follow-up with Dr. Chaparro 1 month. 


2.   Recommend avoid diuretics for hypertension. 








Objective





- Vital Signs


Vital signs: 


                                   Vital Signs











Temp  98.3 F   10/28/20 12:15


 


Pulse  68   10/28/20 12:15


 


Resp      


 


BP  142/71   10/28/20 12:15


 


Pulse Ox      








                                 Intake & Output











 10/27/20 10/28/20 10/28/20





 18:59 06:59 18:59


 


Weight   159.665 kg

## 2020-11-11 ENCOUNTER — HOSPITAL ENCOUNTER (OUTPATIENT)
Dept: HOSPITAL 47 - BARWHC3 | Age: 55
Discharge: HOME | End: 2020-11-11
Attending: SURGERY
Payer: COMMERCIAL

## 2020-11-11 VITALS — BODY MASS INDEX: 55.3 KG/M2

## 2020-11-11 VITALS
TEMPERATURE: 98.2 F | RESPIRATION RATE: 16 BRPM | HEART RATE: 61 BPM | SYSTOLIC BLOOD PRESSURE: 134 MMHG | DIASTOLIC BLOOD PRESSURE: 72 MMHG

## 2020-11-11 DIAGNOSIS — Z71.3: ICD-10-CM

## 2020-11-11 DIAGNOSIS — M47.9: ICD-10-CM

## 2020-11-11 DIAGNOSIS — D50.9: ICD-10-CM

## 2020-11-11 DIAGNOSIS — E66.01: Primary | ICD-10-CM

## 2020-11-11 DIAGNOSIS — E55.9: ICD-10-CM

## 2020-11-11 DIAGNOSIS — I11.9: ICD-10-CM

## 2020-11-11 DIAGNOSIS — I82.403: ICD-10-CM

## 2020-11-11 DIAGNOSIS — H81.09: ICD-10-CM

## 2020-11-11 DIAGNOSIS — K29.50: ICD-10-CM

## 2020-11-11 DIAGNOSIS — M17.0: ICD-10-CM

## 2020-11-11 DIAGNOSIS — K21.9: ICD-10-CM

## 2020-11-11 DIAGNOSIS — F32.9: ICD-10-CM

## 2020-11-11 DIAGNOSIS — Z98.84: ICD-10-CM

## 2020-11-11 DIAGNOSIS — N17.9: ICD-10-CM

## 2020-11-11 PROCEDURE — 99201: CPT

## 2020-11-11 PROCEDURE — 99211 OFF/OP EST MAY X REQ PHY/QHP: CPT

## 2020-11-11 NOTE — P.PN
Subjective


Progress Note Date: 11/11/20








DATE OF SERVICE: 11/11/2020





CHIEF COMPLAINT: Status post sleeve gastrectomy





HISTORY OF PRESENT ILLNESS:  Ovi Olivarez is a 55-year-old male status post 

sleeve gastrectomy, 09/23/20. He is 2 month out. He is drinking more water over 

64 oz daily. He went to the local ER and reports kidney stones. He has lost 60+ 

pounds. His creatinine is normal. He reports having ketones. He is having bowel 

movements. He has history of DVT and swelling of the leg, pre-existing.





At height of 5 feet 7 inches, his ideal body weight is 158 pounds.   His highest

weight was 415 pounds. His highest BMI was 65.1. He comes in 352 pounds from 351

pounds, 2 week ago. He has gained 1 pounds in 2 weeks. Lifetime weight loss of 

63 pounds. Percent excess weight loss lifetime, 24%. His body mass index is 

55.3.  He is 194 pounds overweight.





PHYSICAL EXAM: 


VITAL SIGNS: Height 5 foot 7 inches, weight 352 pounds. BMI 55.3


                                   Vital Signs











Temp  98.2 F   11/11/20 14:20


 


Pulse  61   11/11/20 14:20


 


Resp  16   11/11/20 14:20


 


BP  134/72   11/11/20 14:20


 


Pulse Ox      











GENERAL: Well-developed in no acute distress.  


HEENT: No scleral icterus.  Extraocular movements grossly intact.  Hears 

conversational speech.  No nasal drainage.


NECK: Supple without lymphadenopathy. 


CHEST: Nonlabored respirations with equal bilateral excursions. 


CARDIOVASCULAR: Regular rate and regular rhythm. Distal 2+ pulses. 


ABDOMEN: Incisions intact. No infection


MUSCULOSKELETAL: No clubbing, cyanosis.  2+ edema lower extremity edema.


NEURO: No focal or lateralizing signs. Cranial nerves 2 through 12 grossly 

within normal limits. 


PSYCH: Appropriate affect. Alert and oriented to person, place and time.


SKIN: Good skin turgor.  Well perfused.





ASSESSMENT: 


1.  Morbid obesity due to excess calories


2.  Body mass index of 65.1, initial to 55.3


3.  Osteoarthritis of the knees.


4.  Osteoarthritis of the lower back.


5.  Hypertensive heart disease.


6.  Gastroesophageal reflux disease


7.  DVT legs


8.  Meniere's disease


9.  Depressive disorder


10.  Iron deficiency anemia


11.  Vitamin D deficiency


12.  Chronic gastritis


13.  Dietary surveillance and counseling. 


14.  Status post sleeve gastrectomy


15.  Recent acute kidney injury due to dehydration





PLAN:


1. Recommend follow up with nephrologist for history of kidney failure


2. Recommend flomax for lower urinary obstructive symptoms.





Objective





- Vital Signs


Vital signs: 


                                   Vital Signs











Temp  98.2 F   11/11/20 14:20


 


Pulse  61   11/11/20 14:20


 


Resp  16   11/11/20 14:20


 


BP  134/72   11/11/20 14:20


 


Pulse Ox      








                                 Intake & Output











 11/10/20 11/11/20 11/11/20





 18:59 06:59 18:59


 


Weight   160.118 kg

## 2020-11-25 ENCOUNTER — HOSPITAL ENCOUNTER (OUTPATIENT)
Dept: HOSPITAL 47 - BARWHC3 | Age: 55
Discharge: HOME | End: 2020-11-25
Attending: SURGERY
Payer: COMMERCIAL

## 2020-11-25 VITALS
TEMPERATURE: 98.3 F | SYSTOLIC BLOOD PRESSURE: 142 MMHG | HEART RATE: 87 BPM | DIASTOLIC BLOOD PRESSURE: 83 MMHG | RESPIRATION RATE: 18 BRPM

## 2020-11-25 DIAGNOSIS — K21.9: ICD-10-CM

## 2020-11-25 DIAGNOSIS — M17.0: ICD-10-CM

## 2020-11-25 DIAGNOSIS — E44.0: ICD-10-CM

## 2020-11-25 DIAGNOSIS — I82.403: ICD-10-CM

## 2020-11-25 DIAGNOSIS — E66.01: Primary | ICD-10-CM

## 2020-11-25 DIAGNOSIS — E89.1: ICD-10-CM

## 2020-11-25 DIAGNOSIS — K74.1: ICD-10-CM

## 2020-11-25 DIAGNOSIS — I13.10: ICD-10-CM

## 2020-11-25 DIAGNOSIS — Z98.84: ICD-10-CM

## 2020-11-25 DIAGNOSIS — D50.8: ICD-10-CM

## 2020-11-25 DIAGNOSIS — M47.9: ICD-10-CM

## 2020-11-25 DIAGNOSIS — D50.9: ICD-10-CM

## 2020-11-25 DIAGNOSIS — Z71.3: ICD-10-CM

## 2020-11-25 DIAGNOSIS — H81.09: ICD-10-CM

## 2020-11-25 DIAGNOSIS — E55.9: ICD-10-CM

## 2020-11-25 DIAGNOSIS — N18.9: ICD-10-CM

## 2020-11-25 DIAGNOSIS — K50.90: ICD-10-CM

## 2020-11-25 DIAGNOSIS — F32.9: ICD-10-CM

## 2020-11-25 LAB
ERYTHROCYTE [DISTWIDTH] IN BLOOD BY AUTOMATED COUNT: 5.06 M/UL (ref 4.3–5.9)
ERYTHROCYTE [DISTWIDTH] IN BLOOD: 14.9 % (ref 11.5–15.5)
HCT VFR BLD AUTO: 43.5 % (ref 39–53)
HGB BLD-MCNC: 13.7 GM/DL (ref 13–17.5)
MCH RBC QN AUTO: 27 PG (ref 25–35)
MCHC RBC AUTO-ENTMCNC: 31.4 G/DL (ref 31–37)
MCV RBC AUTO: 86.1 FL (ref 80–100)
PLATELET # BLD AUTO: 308 K/UL (ref 150–450)
WBC # BLD AUTO: 6 K/UL (ref 3.8–10.6)

## 2020-11-25 PROCEDURE — 83970 ASSAY OF PARATHORMONE: CPT

## 2020-11-25 PROCEDURE — 85730 THROMBOPLASTIN TIME PARTIAL: CPT

## 2020-11-25 PROCEDURE — 80053 COMPREHEN METABOLIC PANEL: CPT

## 2020-11-25 PROCEDURE — 83735 ASSAY OF MAGNESIUM: CPT

## 2020-11-25 PROCEDURE — 82525 ASSAY OF COPPER: CPT

## 2020-11-25 PROCEDURE — 84100 ASSAY OF PHOSPHORUS: CPT

## 2020-11-25 PROCEDURE — 83550 IRON BINDING TEST: CPT

## 2020-11-25 PROCEDURE — 84425 ASSAY OF VITAMIN B-1: CPT

## 2020-11-25 PROCEDURE — 84255 ASSAY OF SELENIUM: CPT

## 2020-11-25 PROCEDURE — 99211 OFF/OP EST MAY X REQ PHY/QHP: CPT

## 2020-11-25 PROCEDURE — 82728 ASSAY OF FERRITIN: CPT

## 2020-11-25 PROCEDURE — 84134 ASSAY OF PREALBUMIN: CPT

## 2020-11-25 PROCEDURE — 82746 ASSAY OF FOLIC ACID SERUM: CPT

## 2020-11-25 PROCEDURE — 83540 ASSAY OF IRON: CPT

## 2020-11-25 PROCEDURE — 84443 ASSAY THYROID STIM HORMONE: CPT

## 2020-11-25 PROCEDURE — 85610 PROTHROMBIN TIME: CPT

## 2020-11-25 PROCEDURE — 84590 ASSAY OF VITAMIN A: CPT

## 2020-11-25 PROCEDURE — 84630 ASSAY OF ZINC: CPT

## 2020-11-25 PROCEDURE — 83036 HEMOGLOBIN GLYCOSYLATED A1C: CPT

## 2020-11-25 PROCEDURE — 80061 LIPID PANEL: CPT

## 2020-11-25 PROCEDURE — 82607 VITAMIN B-12: CPT

## 2020-11-25 PROCEDURE — 82306 VITAMIN D 25 HYDROXY: CPT

## 2020-11-25 PROCEDURE — 85027 COMPLETE CBC AUTOMATED: CPT

## 2020-11-25 NOTE — P.PN
Subjective


Progress Note Date: 11/25/20





DATE OF SERVICE: 11/25/2020





CHIEF COMPLAINT: Status post sleeve gastrectomy





HISTORY OF PRESENT ILLNESS:  Ovi Olivarez is a 55-year-old male status post 

sleeve gastrectomy, 09/23/20. He is 2 month out. He lost 80 pounds in 2 months. 

He is on diuretics and placed back on them by the nephrologist. He reports low 

potassium. He is happy with his weight loss. He wants to get down to 200 pounds.







At height of 5 feet 7 inches, his ideal body weight is 158 pounds.   His highest

weight was 415 pounds. His highest BMI was 65.1. He comes in 334 pounds from 352

pounds, 2 week ago. He has lost 18 pounds in 2 weeks. Lifetime weight loss of 81

pounds. Percent excess weight loss lifetime, 31 %. His body mass index is 52.5. 

He is 176 pounds overweight.





PHYSICAL EXAM: 


VITAL SIGNS: Height 5 foot 7 inches, weight 334 pounds. BMI 52.5


                                   Vital Signs











Temp  98.3 F   11/25/20 13:14


 


Pulse  87   11/25/20 13:14


 


Resp  18   11/25/20 13:14


 


BP  142/83   11/25/20 13:14


 


Pulse Ox      














GENERAL: Well-developed in no acute distress.  


HEENT: No scleral icterus.  Extraocular movements grossly intact.  Hears 

conversational speech.  No nasal drainage.


NECK: Supple without lymphadenopathy. 


CHEST: Nonlabored respirations with equal bilateral excursions. 


CARDIOVASCULAR: Regular rate and regular rhythm. Distal 2+ pulses. 


ABDOMEN: Incisions intact. No infection


MUSCULOSKELETAL: No clubbing, cyanosis.  


NEURO: No focal or lateralizing signs. Cranial nerves 2 through 12 grossly 

within normal limits. 


PSYCH: Appropriate affect. Alert and oriented to person, place and time.


SKIN: Good skin turgor.  Well perfused.





ASSESSMENT: 


1.  Morbid obesity due to excess calories


2.  Body mass index of 65.1, initial to 52.5


3.  Osteoarthritis of the knees.


4.  Osteoarthritis of the lower back.


5.  Hypertensive heart disease.


6.  Gastroesophageal reflux disease


7.  DVT legs


8.  Meniere's disease


9.  Depressive disorder


10.  Iron deficiency anemia


11.  Vitamin D deficiency


12.  Chronic gastritis


13.  Dietary surveillance and counseling. 


14.  Status post sleeve gastrectomy


15.  Chronic kidney disease





PLAN:


1.  Follow up labs for low potassium. 




















Objective





- Vital Signs


Vital signs: 


                                   Vital Signs











Temp  98.3 F   11/25/20 13:14


 


Pulse  87   11/25/20 13:14


 


Resp  18   11/25/20 13:14


 


BP  142/83   11/25/20 13:14


 


Pulse Ox      








                                 Intake & Output











 11/24/20 11/25/20 11/25/20





 18:59 06:59 18:59


 


Weight   151.953 kg














- Labs


CBC & Chem 7: 


                                 11/25/20 14:25





                                 11/25/20 14:25

## 2020-11-26 LAB
ALBUMIN SERPL-MCNC: 4.5 G/DL (ref 3.8–4.9)
ALBUMIN/GLOB SERPL: 1.67 G/DL (ref 1.6–3.17)
ALP SERPL-CCNC: 63 U/L (ref 41–126)
ALT SERPL-CCNC: 31 U/L (ref 10–49)
ANION GAP SERPL CALC-SCNC: 9.3 MMOL/L (ref 4–12)
APTT BLD: 27.6 SEC (ref 23.5–31)
AST SERPL-CCNC: 34 U/L (ref 14–35)
BUN SERPL-SCNC: 18 MG/DL (ref 9–27)
BUN/CREAT SERPL: 15 RATIO (ref 12–20)
CALCIUM SPEC-MCNC: 10.1 MG/DL (ref 8.7–10.3)
CHLORIDE SERPL-SCNC: 99 MMOL/L (ref 96–109)
CHOLEST SERPL-MCNC: 223 MG/DL (ref 0–200)
CO2 SERPL-SCNC: 31.7 MMOL/L (ref 21.6–31.8)
FERRITIN SERPL-MCNC: 257.7 NG/ML (ref 22–322)
GLOBULIN SER CALC-MCNC: 2.7 G/DL (ref 1.6–3.3)
GLUCOSE SERPL-MCNC: 87 MG/DL (ref 70–110)
HBA1C MFR BLD: 5.6 % (ref 4–6)
HDLC SERPL-MCNC: 56 MG/DL (ref 40–60)
INR PPP: 1.04 (ref 0.9–1.11)
IRON SERPL-MCNC: 56 UG/DL (ref 65–175)
LDLC SERPL CALC-MCNC: 142.4 MG/DL (ref 0–131)
MAGNESIUM SPEC-SCNC: 1.9 MG/DL (ref 1.5–2.4)
POTASSIUM SERPL-SCNC: 4.2 MMOL/L (ref 3.5–5.5)
PREALB SERPL-MCNC: 23 MG/DL (ref 18–42)
PROT SERPL-MCNC: 7.2 G/DL (ref 6.2–8.2)
PT BLD: 11.2 SEC (ref 9.9–11.9)
SODIUM SERPL-SCNC: 140 MMOL/L (ref 135–145)
TIBC SERPL-MCNC: 302 UG/DL (ref 228–460)
TRIGL SERPL-MCNC: 123 MG/DL (ref 0–149)
VIT B12 SERPL-MCNC: 702 PG/ML (ref 200–944)
VLDLC SERPL CALC-MCNC: 24.6 MG/DL (ref 5–40)

## 2020-11-27 LAB — ZINC SERPL-MCNC: 70 UG/DL (ref 60–130)

## 2020-11-30 LAB — VIT B1 BLD-MCNC: 70 UG/L (ref 38–122)

## 2021-01-13 ENCOUNTER — HOSPITAL ENCOUNTER (OUTPATIENT)
Dept: HOSPITAL 47 - BARWHC3 | Age: 56
Discharge: HOME | End: 2021-01-13
Attending: SURGERY
Payer: COMMERCIAL

## 2021-01-13 VITALS
DIASTOLIC BLOOD PRESSURE: 86 MMHG | RESPIRATION RATE: 18 BRPM | SYSTOLIC BLOOD PRESSURE: 164 MMHG | HEART RATE: 74 BPM | TEMPERATURE: 97.6 F

## 2021-01-13 VITALS — BODY MASS INDEX: 51.5 KG/M2

## 2021-01-13 DIAGNOSIS — K29.50: ICD-10-CM

## 2021-01-13 DIAGNOSIS — M47.9: ICD-10-CM

## 2021-01-13 DIAGNOSIS — D50.8: ICD-10-CM

## 2021-01-13 DIAGNOSIS — K21.9: ICD-10-CM

## 2021-01-13 DIAGNOSIS — E55.9: ICD-10-CM

## 2021-01-13 DIAGNOSIS — N18.9: ICD-10-CM

## 2021-01-13 DIAGNOSIS — K50.90: ICD-10-CM

## 2021-01-13 DIAGNOSIS — D50.9: ICD-10-CM

## 2021-01-13 DIAGNOSIS — E66.01: Primary | ICD-10-CM

## 2021-01-13 DIAGNOSIS — M17.0: ICD-10-CM

## 2021-01-13 DIAGNOSIS — F32.9: ICD-10-CM

## 2021-01-13 DIAGNOSIS — K74.1: ICD-10-CM

## 2021-01-13 DIAGNOSIS — Z98.84: ICD-10-CM

## 2021-01-13 DIAGNOSIS — I82.409: ICD-10-CM

## 2021-01-13 DIAGNOSIS — I13.10: ICD-10-CM

## 2021-01-13 DIAGNOSIS — E44.0: ICD-10-CM

## 2021-01-13 DIAGNOSIS — Z71.3: ICD-10-CM

## 2021-01-13 DIAGNOSIS — H81.09: ICD-10-CM

## 2021-01-13 LAB
ALBUMIN SERPL-MCNC: 4.6 G/DL (ref 3.8–4.9)
ALBUMIN/GLOB SERPL: 1.77 G/DL (ref 1.6–3.17)
ALP SERPL-CCNC: 71 U/L (ref 41–126)
ALT SERPL-CCNC: 23 U/L (ref 10–49)
ANION GAP SERPL CALC-SCNC: 12.6 MMOL/L (ref 4–12)
APTT BLD: 29.5 SEC (ref 23.5–31)
AST SERPL-CCNC: 25 U/L (ref 14–35)
BUN SERPL-SCNC: 21 MG/DL (ref 9–27)
BUN/CREAT SERPL: 19.09 RATIO (ref 12–20)
CALCIUM SPEC-MCNC: 9.6 MG/DL (ref 8.7–10.3)
CHLORIDE SERPL-SCNC: 98 MMOL/L (ref 96–109)
CHOLEST SERPL-MCNC: 230 MG/DL (ref 0–200)
CO2 SERPL-SCNC: 30.4 MMOL/L (ref 21.6–31.8)
ERYTHROCYTE [DISTWIDTH] IN BLOOD BY AUTOMATED COUNT: 4.78 M/UL (ref 4.3–5.9)
ERYTHROCYTE [DISTWIDTH] IN BLOOD: 13.8 % (ref 11.5–15.5)
FERRITIN SERPL-MCNC: 161.2 NG/ML (ref 22–322)
GLOBULIN SER CALC-MCNC: 2.6 G/DL (ref 1.6–3.3)
GLUCOSE SERPL-MCNC: 83 MG/DL (ref 70–110)
HBA1C MFR BLD: 5.1 % (ref 4–6)
HCT VFR BLD AUTO: 41.4 % (ref 39–53)
HDLC SERPL-MCNC: 62 MG/DL (ref 40–60)
HGB BLD-MCNC: 13.6 GM/DL (ref 13–17.5)
INR PPP: 1.02 (ref 0.9–1.11)
IRON SERPL-MCNC: 52 UG/DL (ref 65–175)
LDLC SERPL CALC-MCNC: 136.4 MG/DL (ref 0–131)
MAGNESIUM SPEC-SCNC: 1.8 MG/DL (ref 1.5–2.4)
MCH RBC QN AUTO: 28.5 PG (ref 25–35)
MCHC RBC AUTO-ENTMCNC: 32.9 G/DL (ref 31–37)
MCV RBC AUTO: 86.6 FL (ref 80–100)
PLATELET # BLD AUTO: 350 K/UL (ref 150–450)
POTASSIUM SERPL-SCNC: 4.2 MMOL/L (ref 3.5–5.5)
PREALB SERPL-MCNC: 24 MG/DL (ref 18–42)
PROT SERPL-MCNC: 7.2 G/DL (ref 6.2–8.2)
PT BLD: 11 SEC (ref 9.9–11.9)
SODIUM SERPL-SCNC: 141 MMOL/L (ref 135–145)
TIBC SERPL-MCNC: 312 UG/DL (ref 228–460)
TRIGL SERPL-MCNC: 158 MG/DL (ref 0–149)
VIT B12 SERPL-MCNC: 609 PG/ML (ref 200–944)
VLDLC SERPL CALC-MCNC: 31.6 MG/DL (ref 5–40)
WBC # BLD AUTO: 7 K/UL (ref 3.8–10.6)

## 2021-01-13 PROCEDURE — 84425 ASSAY OF VITAMIN B-1: CPT

## 2021-01-13 PROCEDURE — 83540 ASSAY OF IRON: CPT

## 2021-01-13 PROCEDURE — 99211 OFF/OP EST MAY X REQ PHY/QHP: CPT

## 2021-01-13 PROCEDURE — 82607 VITAMIN B-12: CPT

## 2021-01-13 PROCEDURE — 85027 COMPLETE CBC AUTOMATED: CPT

## 2021-01-13 PROCEDURE — 84100 ASSAY OF PHOSPHORUS: CPT

## 2021-01-13 PROCEDURE — 82525 ASSAY OF COPPER: CPT

## 2021-01-13 PROCEDURE — 82306 VITAMIN D 25 HYDROXY: CPT

## 2021-01-13 PROCEDURE — 84443 ASSAY THYROID STIM HORMONE: CPT

## 2021-01-13 PROCEDURE — 83970 ASSAY OF PARATHORMONE: CPT

## 2021-01-13 PROCEDURE — 85610 PROTHROMBIN TIME: CPT

## 2021-01-13 PROCEDURE — 84255 ASSAY OF SELENIUM: CPT

## 2021-01-13 PROCEDURE — 82728 ASSAY OF FERRITIN: CPT

## 2021-01-13 PROCEDURE — 84590 ASSAY OF VITAMIN A: CPT

## 2021-01-13 PROCEDURE — 83036 HEMOGLOBIN GLYCOSYLATED A1C: CPT

## 2021-01-13 PROCEDURE — 83550 IRON BINDING TEST: CPT

## 2021-01-13 PROCEDURE — 80061 LIPID PANEL: CPT

## 2021-01-13 PROCEDURE — 84630 ASSAY OF ZINC: CPT

## 2021-01-13 PROCEDURE — 83735 ASSAY OF MAGNESIUM: CPT

## 2021-01-13 PROCEDURE — 82746 ASSAY OF FOLIC ACID SERUM: CPT

## 2021-01-13 PROCEDURE — 85730 THROMBOPLASTIN TIME PARTIAL: CPT

## 2021-01-13 PROCEDURE — 84134 ASSAY OF PREALBUMIN: CPT

## 2021-01-13 PROCEDURE — 80053 COMPREHEN METABOLIC PANEL: CPT

## 2021-01-13 NOTE — P.PN
Subjective


Progress Note Date: 01/13/21











DATE OF SERVICE: 01/13/2021





CHIEF COMPLAINT: Status post sleeve gastrectomy





HISTORY OF PRESENT ILLNESS:  Ovi Olivarez is a 55-year-old male status post 

sleeve gastrectomy, 09/23/20. He is 4 months out.  He has lost almost 100 

pounds. His shoulder feels better. He still reports knee pain. His highest w

eight is 416 pounds. He reports a rough time in the beginning of his weight loss

journey. He had dehydration now improved. He is only taking Diazide and Norvasc.

He is taking fewer medications. He reports swelling in the legs has moderately 

improved. He reports trouble with his left leg with multiple vein stripping and 

blood clots that was pre-existing. He is getting 50 to 65 grams protein daily. 

No reports of dysphagia. 





At height of 5 feet 7 inches, his ideal body weight is 158 pounds.   His highest

weight was 416 pounds. His highest BMI was 65.3. He comes in 328 pounds from 334

pounds, 2 months ago. He has lost 6 pounds in 2 months. Lifetime weight loss of 

88 pounds. Percent excess weight loss lifetime, 34 %. His body mass index is 

51.5.  He is 170 pounds overweight.





PHYSICAL EXAM: 


VITAL SIGNS: Height 5 foot 7 inches, weight 328 pounds. BMI 51.5


                                   Vital Signs











Temp  97.6 F   01/13/21 13:23


 


Pulse  74   01/13/21 13:23


 


Resp  18   01/13/21 13:23


 


BP  164/86   01/13/21 13:23


 


Pulse Ox      








                                 Intake & Output











 01/13/21 01/13/21 01/14/21





 06:59 18:59 06:59


 


Weight  149.232 kg 











GENERAL: Well-developed in no acute distress.  


HEENT: No scleral icterus.  Extraocular movements grossly intact.  Hears 

conversational speech.  No nasal drainage.


NECK: Supple without lymphadenopathy. 


CHEST: Nonlabored respirations with equal bilateral excursions. 


CARDIOVASCULAR: Regular rate and regular rhythm. Distal 2+ pulses. 


ABDOMEN: Incisions intact. No infection


MUSCULOSKELETAL: No clubbing, cyanosis.  


NEURO: No focal or lateralizing signs. Cranial nerves 2 through 12 grossly 

within normal limits. 


PSYCH: Appropriate affect. Alert and oriented to person, place and time.


SKIN: Good skin turgor.  Well perfused.





ASSESSMENT: 


1.  Morbid obesity due to excess calories


2.  Body mass index of 65.1, initial to 51.5


3.  Osteoarthritis of the knees.


4.  Osteoarthritis of the lower back.


5.  Hypertensive heart disease.


6.  Gastroesophageal reflux disease


7.  DVT legs


8.  Meniere's disease


9.  Depressive disorder


10.  Iron deficiency anemia


11.  Vitamin D deficiency


12.  Chronic gastritis


13.  Dietary surveillance and counseling. 


14.  Status post sleeve gastrectomy


15.  Chronic kidney disease





PLAN:


1.  Recommend bariatric labs


2.  Continue fluid intake 64 pounces daily


3.  Follow up 6 months post-op.








Objective





- Vital Signs


Vital signs: 


                                   Vital Signs











Temp  97.6 F   01/13/21 13:23


 


Pulse  74   01/13/21 13:23


 


Resp  18   01/13/21 13:23


 


BP  164/86   01/13/21 13:23


 


Pulse Ox      








                                 Intake & Output











 01/12/21 01/13/21 01/13/21





 18:59 06:59 18:59


 


Weight   149.232 kg














- Labs


CBC & Chem 7: 


                                 01/13/21 14:07





                                 01/13/21 14:07

## 2021-01-14 LAB — ZINC SERPL-MCNC: 73 UG/DL (ref 60–130)

## 2021-01-15 LAB — VIT B1 BLD-MCNC: 70 UG/L (ref 38–122)

## 2021-04-21 ENCOUNTER — HOSPITAL ENCOUNTER (OUTPATIENT)
Age: 56
End: 2021-04-21
Payer: COMMERCIAL

## 2021-04-21 PROCEDURE — 99211 OFF/OP EST MAY X REQ PHY/QHP: CPT

## 2021-04-21 PROCEDURE — 97803 MED NUTRITION INDIV SUBSEQ: CPT

## 2021-04-21 NOTE — P.PN
Subjective


Progress Note Date: 04/21/21








DATE OF SERVICE: 04/21/2021





CHIEF COMPLAINT: Status post sleeve gastrectomy





HISTORY OF PRESENT ILLNESS:  Ovi Olivarez is a 55-year-old male status post 

sleeve gastrectomy, 09/23/20. He is over 7 months out. He comes in with new 

abdominal pain in the last 1 month. He reports right upper quadrant abdominal 

pain only with bending down for the last 2 to 3 weeks. He has lost almost 100 

pounds. He denies heartburn. He is still on medications for his blood pressure. 

He has less swelling of the legs. 





At height of 5 feet 7 inches, his ideal body weight is 158 pounds.   His highest

weight was 416 pounds. His highest BMI was 65.3. He comes in 322 pounds from 328

pounds, 3 months ago. He has lost 6 pounds in 3 months. Lifetime weight loss of 

94 pounds. Percent excess weight loss lifetime, 36 %. His body mass index is 

50.6.  He is 164 pounds overweight.








PAST MEDICAL HISTORY: 


1.  Morbid obesity due to excess calories


2.  Body mass index of 65.1, initial


3.  Osteoarthritis of the knees.


4.  Osteoarthritis of the lower back.


5.  Hypertensive heart disease.


6.  Gastroesophageal reflux disease


7.  DVT legs


8.  Meniere's disease


9.  Depressive disorder





PAST SURGICAL HISTORY: 


1. Varicose vein stripping


2. Umbilical hernia repair


3. Stents bilateral common iliac vein


4. Status post sleeve gastrectomy





HOME MEDICATIONS: 


                                Home Medications











 Medication  Instructions  Recorded  Confirmed


 


Omeprazole 40 mg PO HS 10/21/20 05/12/21


 


Diazoxide 1 mg PO AS DIRECTED 11/25/20 05/12/21


 


polyethylene glycoL 3350 [Miralax] 17 gm PO BID PRN 01/13/21 05/12/21








                                  Previous Rx's











 Medication  Instructions  Recorded


 


Ondansetron Odt [Zofran ODT] 4 mg PO Q8HR PRN #9 tab 09/25/20


 


Acetaminophen Tab [Tylenol Tab] 1,000 mg PO Q6HR PRN #30 tablet 10/23/20


 


amLODIPine [Norvasc] 10 mg PO DAILY #30 tablet 10/23/20











ALLERGIES:


                                        


                                    Allergies











Allergy/AdvReac Type Severity Reaction Status Date / Time


 


morphine Allergy  Itching/ Verified 05/12/21 14:05


 


nortriptyline Allergy  NAUSEA , Verified 05/12/21 14:05





   HEADACHE  











SOCIAL HISTORY: Past tobacco use.   





FAMILY HISTORY: No family history of ulcerative colitis disease or Crohn's 

disease. Family history of morbid obesity. No lupus in the family.  No reports 

of stomach or esophageal cancer.  





REVIEW OF ORGAN SYSTEMS: 


CONSTITUTIONAL: At height of 5 feet 7 inches, his ideal body weight is 158 

pounds.   His highest weight was 415 pounds. His highest BMI was 65.1. 


HEENT: Denies any active troubles with vision or hearing.  


ENDOCRINE: No diabetes. No hypothyroidism. 


CARDIOVASCULAR: No past reports of palpitations or heart attacks or chest pain. 

Hypertensive heart disease.


RESPIRATORY: Denies daytime somnolence. No asthma. 


GASTROINTESTINAL: Denies any bright red blood per rectum.  No diarrhea. No 

constipation.


MUSCULOSKELETAL: Has lower back pain and joint pain.  Has osteoarthritis of the 

knees.  History of bilateral lower extremity edema.


NEURO: No headaches. No seizure disorders. 


PSYCH: Has depression. No suicidal ideation.   


RHEUMATOLOGIC: No lupus.  No rheumatoid arthritis.  


HEMATOLOGIC: Denies any abnormal bleeding or bruising.  Personal history of 

DVTs.


SKIN: No rash.  No skin cancer.


GENITOURINARY: Has chronic kidney disease, Stage III.  No blood in urine.








PHYSICAL EXAM: 


VITAL SIGNS: Height 5 foot 7 inches, weight 322 pounds. BMI 50.6


                                   Vital Signs











Temp  97.9 F   04/21/21 18:49


 


Pulse  62   04/21/21 18:49


 


Resp  16   04/21/21 18:49


 


BP  152/86   04/21/21 18:49


 


Pulse Ox      














GENERAL: Well-developed in no acute distress.  


HEENT: No scleral icterus.  Extraocular movements grossly intact.  Hears 

conversational speech.  No nasal drainage.


NECK: Supple without lymphadenopathy. 


CHEST: Nonlabored respirations with equal bilateral excursions. 


CARDIOVASCULAR: Regular rate and regular rhythm. Distal 2+ pulses. 


ABDOMEN: Incisions intact. No infection


MUSCULOSKELETAL: No clubbing, cyanosis.  


NEURO: No focal or lateralizing signs. Cranial nerves 2 through 12 grossly 

within normal limits. 


PSYCH: Appropriate affect. Alert and oriented to person, place and time.


SKIN: Good skin turgor.  Well perfused.





LABS: Iron is low. Triglycerides is elevated. Cholesterol is elevated. 





ASSESSMENT: 


1.  Morbid obesity due to excess calories


2.  Body mass index of 65.1, initial to 50.6


3.  Osteoarthritis of the knees.


4.  Osteoarthritis of the lower back.


5.  Hypertensive heart disease.


6.  Gastroesophageal reflux disease


7.  DVT legs


8.  Meniere's disease


9.  Depressive disorder


10.  Iron deficiency anemia


11.  Vitamin D deficiency


12.  Chronic gastritis


13.  Dietary surveillance and counseling. 


14.  Status post sleeve gastrectomy


15.  Chronic kidney disease


16.  Cholecystitis





PLAN:


1. He reports right upper quadrant abdominal pain with weight loss, recommend 

right upper quadrant ultrasound of the gallbladder for gallstones. 


2. Recommend bariatric labs. 


3. He may have potential adhesions which may need further evaluation.

## 2021-04-28 ENCOUNTER — HOSPITAL ENCOUNTER (OUTPATIENT)
Dept: HOSPITAL 47 - RADUSWWP | Age: 56
Discharge: HOME | End: 2021-04-28
Attending: SURGERY
Payer: COMMERCIAL

## 2021-04-28 DIAGNOSIS — K90.89: ICD-10-CM

## 2021-04-28 DIAGNOSIS — K74.1: ICD-10-CM

## 2021-04-28 DIAGNOSIS — N19: ICD-10-CM

## 2021-04-28 DIAGNOSIS — E89.1: ICD-10-CM

## 2021-04-28 DIAGNOSIS — E55.9: ICD-10-CM

## 2021-04-28 DIAGNOSIS — D50.8: ICD-10-CM

## 2021-04-28 DIAGNOSIS — K50.90: ICD-10-CM

## 2021-04-28 DIAGNOSIS — K76.0: Primary | ICD-10-CM

## 2021-04-28 DIAGNOSIS — E66.01: ICD-10-CM

## 2021-04-28 LAB
ALBUMIN SERPL-MCNC: 4.2 G/DL (ref 3.5–5)
ALP SERPL-CCNC: 65 U/L (ref 38–126)
ALT SERPL-CCNC: 19 U/L (ref 4–49)
ANION GAP SERPL CALC-SCNC: 5 MMOL/L
APTT BLD: 24.2 SEC (ref 22–30)
AST SERPL-CCNC: 28 U/L (ref 17–59)
BUN SERPL-SCNC: 21 MG/DL (ref 9–20)
CALCIUM SPEC-MCNC: 9.3 MG/DL (ref 8.4–10.2)
CHLORIDE SERPL-SCNC: 103 MMOL/L (ref 98–107)
CHOLEST SERPL-MCNC: 208 MG/DL (ref ?–200)
CO2 SERPL-SCNC: 31 MMOL/L (ref 22–30)
ERYTHROCYTE [DISTWIDTH] IN BLOOD BY AUTOMATED COUNT: 4.69 M/UL (ref 4.3–5.9)
ERYTHROCYTE [DISTWIDTH] IN BLOOD: 13.2 % (ref 11.5–15.5)
GLUCOSE SERPL-MCNC: 81 MG/DL (ref 74–99)
HBA1C MFR BLD: 5.3 % (ref 4–6)
HCT VFR BLD AUTO: 39.5 % (ref 39–53)
HDLC SERPL-MCNC: 61 MG/DL (ref 40–60)
HGB BLD-MCNC: 13.2 GM/DL (ref 13–17.5)
INR PPP: 1 (ref ?–1.2)
LDLC SERPL CALC-MCNC: 131 MG/DL (ref 0–99)
MAGNESIUM SPEC-SCNC: 2 MG/DL (ref 1.6–2.3)
MCH RBC QN AUTO: 28.1 PG (ref 25–35)
MCHC RBC AUTO-ENTMCNC: 33.3 G/DL (ref 31–37)
MCV RBC AUTO: 84.4 FL (ref 80–100)
PLATELET # BLD AUTO: 309 K/UL (ref 150–450)
POTASSIUM SERPL-SCNC: 4.1 MMOL/L (ref 3.5–5.1)
PROT SERPL-MCNC: 7.3 G/DL (ref 6.3–8.2)
PT BLD: 10.4 SEC (ref 9–12)
SODIUM SERPL-SCNC: 139 MMOL/L (ref 137–145)
TRIGL SERPL-MCNC: 80 MG/DL (ref ?–150)
WBC # BLD AUTO: 6.2 K/UL (ref 3.8–10.6)

## 2021-04-28 PROCEDURE — 83970 ASSAY OF PARATHORMONE: CPT

## 2021-04-28 PROCEDURE — 84630 ASSAY OF ZINC: CPT

## 2021-04-28 PROCEDURE — 82746 ASSAY OF FOLIC ACID SERUM: CPT

## 2021-04-28 PROCEDURE — 80053 COMPREHEN METABOLIC PANEL: CPT

## 2021-04-28 PROCEDURE — 84590 ASSAY OF VITAMIN A: CPT

## 2021-04-28 PROCEDURE — 76705 ECHO EXAM OF ABDOMEN: CPT

## 2021-04-28 PROCEDURE — 84443 ASSAY THYROID STIM HORMONE: CPT

## 2021-04-28 PROCEDURE — 85610 PROTHROMBIN TIME: CPT

## 2021-04-28 PROCEDURE — 82607 VITAMIN B-12: CPT

## 2021-04-28 PROCEDURE — 82728 ASSAY OF FERRITIN: CPT

## 2021-04-28 PROCEDURE — 85730 THROMBOPLASTIN TIME PARTIAL: CPT

## 2021-04-28 PROCEDURE — 84100 ASSAY OF PHOSPHORUS: CPT

## 2021-04-28 PROCEDURE — 83036 HEMOGLOBIN GLYCOSYLATED A1C: CPT

## 2021-04-28 PROCEDURE — 83550 IRON BINDING TEST: CPT

## 2021-04-28 PROCEDURE — 83735 ASSAY OF MAGNESIUM: CPT

## 2021-04-28 PROCEDURE — 82306 VITAMIN D 25 HYDROXY: CPT

## 2021-04-28 PROCEDURE — 84134 ASSAY OF PREALBUMIN: CPT

## 2021-04-28 PROCEDURE — 84425 ASSAY OF VITAMIN B-1: CPT

## 2021-04-28 PROCEDURE — 85027 COMPLETE CBC AUTOMATED: CPT

## 2021-04-28 PROCEDURE — 84255 ASSAY OF SELENIUM: CPT

## 2021-04-28 PROCEDURE — 82525 ASSAY OF COPPER: CPT

## 2021-04-28 PROCEDURE — 83540 ASSAY OF IRON: CPT

## 2021-04-28 PROCEDURE — 80061 LIPID PANEL: CPT

## 2021-04-28 NOTE — US
EXAMINATION TYPE: US gallbladder

 

DATE OF EXAM: 4/28/2021

 

COMPARISON: NONE

 

CLINICAL HISTORY: R10.11 RUQ pain. bariatric patient, morbidly obese, RUQ pain a few weeks ago, none 
now 

 

EXAM MEASUREMENTS:

 

Liver Length:  16.3 cm   

Gallbladder Wall:  0.3 cm   

CBD:  0.6 cm

Right Kidney:  11.2 x 4.5 x 6.1 cm

 

**habitus and bowel gas limits study

 

Pancreas:  not seen due to bowel gas

Liver:  difficult to penetrate  

Gallbladder:  wnl

**Evidence for sonographic Chen's sign:  no

CBD:  wnl 

Right Kidney:  wnl 

 

 

 

IMPRESSION:

Hepatic steatosis.

## 2021-04-29 LAB
FERRITIN SERPL-MCNC: 110.2 NG/ML (ref 22–322)
IRON SERPL-MCNC: 64 UG/DL (ref 65–175)
PREALB SERPL-MCNC: 23 MG/DL (ref 18–42)
TIBC SERPL-MCNC: 302 UG/DL (ref 228–460)
VIT B12 SERPL-MCNC: 420 PG/ML (ref 200–944)
ZINC SERPL-MCNC: 71 UG/DL (ref 60–130)

## 2021-05-12 ENCOUNTER — HOSPITAL ENCOUNTER (OUTPATIENT)
Dept: HOSPITAL 47 - BARWHC3 | Age: 56
End: 2021-05-12
Attending: SURGERY
Payer: COMMERCIAL

## 2021-05-12 VITALS
RESPIRATION RATE: 18 BRPM | HEART RATE: 59 BPM | DIASTOLIC BLOOD PRESSURE: 83 MMHG | TEMPERATURE: 98.4 F | SYSTOLIC BLOOD PRESSURE: 144 MMHG

## 2021-05-12 VITALS — BODY MASS INDEX: 50.5 KG/M2

## 2021-05-12 DIAGNOSIS — Z71.3: ICD-10-CM

## 2021-05-12 DIAGNOSIS — I11.9: ICD-10-CM

## 2021-05-12 DIAGNOSIS — M17.0: ICD-10-CM

## 2021-05-12 DIAGNOSIS — D50.9: ICD-10-CM

## 2021-05-12 DIAGNOSIS — E55.9: ICD-10-CM

## 2021-05-12 DIAGNOSIS — M47.9: ICD-10-CM

## 2021-05-12 DIAGNOSIS — K29.50: ICD-10-CM

## 2021-05-12 DIAGNOSIS — E66.01: Primary | ICD-10-CM

## 2021-05-12 DIAGNOSIS — N18.9: ICD-10-CM

## 2021-05-12 DIAGNOSIS — H81.09: ICD-10-CM

## 2021-05-12 DIAGNOSIS — Z71.2: ICD-10-CM

## 2021-05-12 DIAGNOSIS — K21.9: ICD-10-CM

## 2021-05-12 DIAGNOSIS — Z98.84: ICD-10-CM

## 2021-05-12 DIAGNOSIS — F32.9: ICD-10-CM

## 2021-05-12 DIAGNOSIS — Z88.5: ICD-10-CM

## 2021-05-12 DIAGNOSIS — Z87.891: ICD-10-CM

## 2021-05-12 DIAGNOSIS — I82.403: ICD-10-CM

## 2021-05-12 PROCEDURE — 99211 OFF/OP EST MAY X REQ PHY/QHP: CPT

## 2021-05-12 NOTE — P.PN
Subjective


Progress Note Date: 05/12/21








DATE OF SERVICE: 05/12/2021





CHIEF COMPLAINT: Status post sleeve gastrectomy





HISTORY OF PRESENT ILLNESS:  Ovi Olivarez is a 55-year-old male status post 

sleeve gastrectomy, 09/23/20.  He is a 8 to 9 months out. His weight is stable. 

He reports having a thrombosed hemorrhoid. He is feeling better. He comes in 

following ultrasound of his gallbladder. He denies any further abdominal pain at

the right upper quadrant. His protein intake is 65 grams of protein dialy. He is

not keeping a food diary journal. He reports eating the wrong foods. He is 

eating pasta, bread, rice and potato.  





At height of 5 feet 7 inches, his ideal body weight is 158 pounds.   His highest

weight was 416 pounds. His highest BMI was 65.3. He comes in 322 pounds 

unchanged in 3 months ago. Lifetime weight loss of 94 pounds. Percent excess 

weight loss lifetime, 36 %. His body mass index is 50.6.  He is 164 pounds 

overweight.








PAST MEDICAL HISTORY: 


1.  Morbid obesity due to excess calories


2.  Body mass index of 65.1, initial


3.  Osteoarthritis of the knees.


4.  Osteoarthritis of the lower back.


5.  Hypertensive heart disease.


6.  Gastroesophageal reflux disease


7.  DVT legs


8.  Meniere's disease


9.  Depressive disorder





PAST SURGICAL HISTORY: 


1. Varicose vein stripping


2. Umbilical hernia repair


3. Stents bilateral common iliac vein


4. Status post sleeve gastrectomy





HOME MEDICATIONS: 


                                Home Medications











 Medication  Instructions  Recorded  Confirmed


 


Omeprazole 40 mg PO HS 10/21/20 05/12/21


 


Diazoxide 1 mg PO AS DIRECTED 11/25/20 05/12/21


 


polyethylene glycoL 3350 [Miralax] 17 gm PO BID PRN 01/13/21 05/12/21








                                  Previous Rx's











 Medication  Instructions  Recorded


 


Ondansetron Odt [Zofran ODT] 4 mg PO Q8HR PRN #9 tab 09/25/20


 


Acetaminophen Tab [Tylenol Tab] 1,000 mg PO Q6HR PRN #30 tablet 10/23/20


 


amLODIPine [Norvasc] 10 mg PO DAILY #30 tablet 10/23/20











ALLERGIES:


                                        


                                    Allergies











Allergy/AdvReac Type Severity Reaction Status Date / Time


 


morphine Allergy  Itching/ Verified 05/12/21 14:05


 


nortriptyline Allergy  NAUSEA , Verified 05/12/21 14:05





   HEADACHE  











SOCIAL HISTORY: Past tobacco use.   





FAMILY HISTORY: No family history of ulcerative colitis disease or Crohn's 

disease. Family history of morbid obesity. No lupus in the family.  No reports 

of stomach or esophageal cancer.  





REVIEW OF ORGAN SYSTEMS: 


CONSTITUTIONAL: At height of 5 feet 7 inches, his ideal body weight is 158 

pounds.   His highest weight was 416 pounds. His highest BMI was 65.1. 


HEENT: Denies any active troubles with vision or hearing.  


ENDOCRINE: No diabetes. No hypothyroidism. 


CARDIOVASCULAR: No past reports of palpitations or heart attacks or chest pain. 

Hypertensive heart disease.


RESPIRATORY: Denies daytime somnolence. No asthma. 


GASTROINTESTINAL: He had a thrombosed hemorrhoid with pain.  No diarrhea. No 

constipation.


MUSCULOSKELETAL: Has lower back pain and joint pain.  Has osteoarthritis of the 

knees.  History of bilateral lower extremity edema.


NEURO: No headaches. No seizure disorders. 


PSYCH: Has depression. No suicidal ideation.   


RHEUMATOLOGIC: No lupus.  No rheumatoid arthritis.  


HEMATOLOGIC: Denies any abnormal bleeding or bruising.  Personal history of 

DVTs.


SKIN: No rash.  No skin cancer.


GENITOURINARY: Has chronic kidney disease, Stage III.  No blood in urine.








PHYSICAL EXAM: 


VITAL SIGNS: Height 5 foot 7 inches, weight 322 pounds. BMI 50.6





                                   Vital Signs











Temp  98.4 F   05/12/21 14:04


 


Pulse  59 L  05/12/21 14:04


 


Resp  18   05/12/21 14:04


 


BP  144/83   05/12/21 14:04


 


Pulse Ox      

















GENERAL: Well-developed in no acute distress.  


HEENT: No scleral icterus.  Extraocular movements grossly intact.  Hears 

conversational speech.  No nasal drainage.


NECK: Supple without lymphadenopathy. 


CHEST: Nonlabored respirations with equal bilateral excursions. 


CARDIOVASCULAR: Regular rate and regular rhythm. Distal 2+ pulses. 


ABDOMEN: Incisions intact. No infection


MUSCULOSKELETAL: No clubbing, cyanosis.  


NEURO: No focal or lateralizing signs. Cranial nerves 2 through 12 grossly 

within normal limits. 


PSYCH: Appropriate affect. Alert and oriented to person, place and time.


SKIN: Good skin turgor.  Well perfused.





LABS: Iron is low. Triglycerides is elevated. Cholesterol is elevated. 





STUDIES: Ultrasound of gallbladder independently without large gallstones.  

Questionable sludge in the gallbladder identified.  This is my independent 

interpretation.





ASSESSMENT: 


1.  Morbid obesity due to excess calories


2.  Body mass index of 65.1, initial to 50.6


3.  Osteoarthritis of the knees.


4.  Osteoarthritis of the lower back.


5.  Hypertensive heart disease.


6.  Gastroesophageal reflux disease


7.  DVT legs


8.  Meniere's disease


9.  Depressive disorder


10.  Iron deficiency anemia


11.  Vitamin D deficiency


12.  Chronic gastritis


13.  Dietary surveillance and counseling. 


14.  Status post sleeve gastrectomy


15.  Chronic kidney disease


16.  Right upper quadrant abdominal pain.





PLAN:


1.  He reports eating the wrong foods eating the wrong foods. Recommend food 

diary journal. 


2.  Recommend new bariatric labs. 


3.  Recommend bariatric dietitian.











Objective





- Vital Signs


Vital signs: 


                                   Vital Signs











Temp  98.4 F   05/12/21 14:04


 


Pulse  59 L  05/12/21 14:04


 


Resp  18   05/12/21 14:04


 


BP  144/83   05/12/21 14:04


 


Pulse Ox      








                                 Intake & Output











 05/11/21 05/12/21 05/12/21





 18:59 06:59 18:59


 


Weight   146.51 kg

## 2023-02-01 ENCOUNTER — HOSPITAL ENCOUNTER (OUTPATIENT)
Dept: HOSPITAL 47 - BARWHC3 | Age: 58
End: 2023-02-01
Attending: SURGERY
Payer: MEDICARE

## 2023-02-01 VITALS
TEMPERATURE: 98 F | DIASTOLIC BLOOD PRESSURE: 75 MMHG | SYSTOLIC BLOOD PRESSURE: 131 MMHG | HEART RATE: 65 BPM | RESPIRATION RATE: 12 BRPM

## 2023-02-01 VITALS — BODY MASS INDEX: 53.7 KG/M2

## 2023-02-01 DIAGNOSIS — D50.8: ICD-10-CM

## 2023-02-01 DIAGNOSIS — Z87.891: ICD-10-CM

## 2023-02-01 DIAGNOSIS — E66.01: Primary | ICD-10-CM

## 2023-02-01 DIAGNOSIS — E55.9: ICD-10-CM

## 2023-02-01 DIAGNOSIS — I10: ICD-10-CM

## 2023-02-01 DIAGNOSIS — N19: ICD-10-CM

## 2023-02-01 DIAGNOSIS — E89.1: ICD-10-CM

## 2023-02-01 DIAGNOSIS — E46: ICD-10-CM

## 2023-02-01 DIAGNOSIS — E44.0: ICD-10-CM

## 2023-02-01 DIAGNOSIS — E44.1: ICD-10-CM

## 2023-02-01 DIAGNOSIS — Z88.5: ICD-10-CM

## 2023-02-01 DIAGNOSIS — K74.1: ICD-10-CM

## 2023-02-01 DIAGNOSIS — D50.9: ICD-10-CM

## 2023-02-01 DIAGNOSIS — K91.2: ICD-10-CM

## 2023-02-01 DIAGNOSIS — K21.9: ICD-10-CM

## 2023-02-01 DIAGNOSIS — M19.90: ICD-10-CM

## 2023-02-01 DIAGNOSIS — E45: ICD-10-CM

## 2023-02-01 DIAGNOSIS — T56.894A: ICD-10-CM

## 2023-02-01 LAB
ALBUMIN SERPL-MCNC: 4.4 G/DL (ref 3.8–4.9)
ALBUMIN/GLOB SERPL: 1.61 G/DL (ref 1.6–3.17)
ALP SERPL-CCNC: 84 U/L (ref 41–126)
ALT SERPL-CCNC: 57 U/L (ref 10–49)
ANION GAP SERPL CALC-SCNC: 10.3 MMOL/L (ref 10–18)
APTT BLD: 25.4 SEC (ref 22–30)
AST SERPL-CCNC: 29 U/L (ref 14–35)
BUN SERPL-SCNC: 17.2 MG/DL (ref 9–27)
BUN/CREAT SERPL: 18.01 RATIO (ref 12–20)
CALCIUM SPEC-MCNC: 9.5 MG/DL (ref 8.7–10.3)
CHLORIDE SERPL-SCNC: 99 MMOL/L (ref 96–109)
CHOLEST SERPL-MCNC: 209 MG/DL (ref 0–200)
CO2 SERPL-SCNC: 29.4 MMOL/L (ref 20–27.5)
ERYTHROCYTE [DISTWIDTH] IN BLOOD BY AUTOMATED COUNT: 4.85 X 10*6/UL (ref 4.4–5.6)
ERYTHROCYTE [DISTWIDTH] IN BLOOD: 13.4 % (ref 11.5–14.5)
FERRITIN SERPL-MCNC: 228 NG/ML (ref 22–322)
GLOBULIN SER CALC-MCNC: 2.7 G/DL (ref 1.6–3.3)
GLUCOSE SERPL-MCNC: 86 MG/DL (ref 70–110)
HCT VFR BLD AUTO: 43.1 % (ref 39.6–50)
HDLC SERPL-MCNC: 73.1 MG/DL (ref 40–60)
HGB BLD-MCNC: 13.6 G/DL (ref 13–17)
INR PPP: 1 (ref ?–1.2)
IRON SERPL-MCNC: 61 UG/DL (ref 65–175)
LDLC SERPL CALC-MCNC: 117.7 MG/DL (ref 0–131)
MAGNESIUM SPEC-SCNC: 2 MG/DL (ref 1.5–2.4)
MCH RBC QN AUTO: 28 PG (ref 27–32)
MCHC RBC AUTO-ENTMCNC: 31.6 G/DL (ref 32–37)
MCV RBC AUTO: 88.9 FL (ref 80–97)
NRBC BLD AUTO-RTO: 0 /100 WBCS (ref 0–0)
PLATELET # BLD AUTO: 343 X 10*3/UL (ref 140–440)
POTASSIUM SERPL-SCNC: 4.2 MMOL/L (ref 3.5–5.5)
PROT SERPL-MCNC: 7.1 G/DL (ref 6.2–8.2)
PT BLD: 10.6 SEC (ref 9–12)
SODIUM SERPL-SCNC: 139 MMOL/L (ref 135–145)
TIBC SERPL-MCNC: 326 UG/DL (ref 228–460)
TRIGL SERPL-MCNC: 90.8 MG/DL (ref 0–149)
VIT B12 SERPL-MCNC: 705 PG/ML (ref 200–944)
VLDLC SERPL CALC-MCNC: 18.16 MG/DL (ref 5–40)
WBC # BLD AUTO: 6.56 X 10*3/UL (ref 4.5–10)

## 2023-02-01 PROCEDURE — 83970 ASSAY OF PARATHORMONE: CPT

## 2023-02-01 PROCEDURE — 85610 PROTHROMBIN TIME: CPT

## 2023-02-01 PROCEDURE — 84443 ASSAY THYROID STIM HORMONE: CPT

## 2023-02-01 PROCEDURE — 80061 LIPID PANEL: CPT

## 2023-02-01 PROCEDURE — 82746 ASSAY OF FOLIC ACID SERUM: CPT

## 2023-02-01 PROCEDURE — 99211 OFF/OP EST MAY X REQ PHY/QHP: CPT

## 2023-02-01 PROCEDURE — 85730 THROMBOPLASTIN TIME PARTIAL: CPT

## 2023-02-01 PROCEDURE — 84590 ASSAY OF VITAMIN A: CPT

## 2023-02-01 PROCEDURE — 82607 VITAMIN B-12: CPT

## 2023-02-01 PROCEDURE — 82306 VITAMIN D 25 HYDROXY: CPT

## 2023-02-01 PROCEDURE — 83540 ASSAY OF IRON: CPT

## 2023-02-01 PROCEDURE — 84425 ASSAY OF VITAMIN B-1: CPT

## 2023-02-01 PROCEDURE — 84100 ASSAY OF PHOSPHORUS: CPT

## 2023-02-01 PROCEDURE — 83735 ASSAY OF MAGNESIUM: CPT

## 2023-02-01 PROCEDURE — 82728 ASSAY OF FERRITIN: CPT

## 2023-02-01 PROCEDURE — 80053 COMPREHEN METABOLIC PANEL: CPT

## 2023-02-01 PROCEDURE — 83550 IRON BINDING TEST: CPT

## 2023-02-01 PROCEDURE — 85027 COMPLETE CBC AUTOMATED: CPT

## 2023-02-01 NOTE — P.HPBAR
Bariatric H&P





- History & Physicial


H&P Date: 23


History & Physicial: 


Visit/CC: 1 yr follow up





Patient initial contact: 





Initial weight: 175.812 kg


Initial weight in pounds: 387.60


Height: 5 ft 7 in


Initial BMI: 60.7





Last weight: 





Current weight: 155.764 kg


Current weight in pounds: 343.40


Current BMI: 53.7





Ideal body weight (based on NIH guidelines): 67.132 kg





Excess body weight loss: 18.4%








The patient is a 57 year-old M who presents for Bariatric Assessment.








He is stress eating with granddaughter living with him. He is seeing a 

counselor. Last follow up 2 years ago. He reports anxiety attacks. He is seeing 

a counselor. Recommend bariatric labs. He was not taking Vitamin D. Recommend 

food 90 grams of protein daily. 





Past Medical History


Past Medical History: GERD/Reflux, Hypertension, Osteoarthritis (OA)


Additional Past Medical History / Comment(s): Meniere's disease (Takes Dyazide 

for this condition), LEFT LEG DVT; Left shoulder pain "bone on bone", varicose 

veins, "irritation in stomach", hx gout, LEFT LEG GANGRENE


History of Any Multi-Drug Resistant Organisms: None Reported


Past Surgical History: Bariatric Surgery, Hernia Repair


Additional Past Surgical History / Comment(s): Left leg varicose vein stripping 

and ablation and sclerotherapy (3 times), Right leg vein stripping and 

sclerotherapy,  umbilical hernia repair with mesh, stents placed in bilateral 

exteriror iliac veins and common iliac veins (stents were placed through 

incisions on back), GASTRIC SLEEVE.


Past Anesthesia/Blood Transfusion Reactions: No Reported Reaction


Additional Past Anesthesia/Blood Transfusion Reaction / Comm: No history of 

blood transfusions to date


Past Psychological History: Depression


Additional Psychological History / Comment(s): 19: Pt states he takes herbal

supplements (had been given an antidepressment but it made him deathly sick, was

given another med to try but was fearful and so has been doing well on his 

herbal supplements)


Smoking Status: Former smoker


Past Alcohol Use History: Occasional


Additional Past Alcohol Use History / Comment(s): STARTED SMOKING AT AGE 13 QUIT

ON AND OFF LAST QUIT AT AGE 39 SMOKED 3/4 - 1PPD


Past Drug Use History: None Reported


Additional Drug Use History / Comment(s): uses hemp oil





- Past Family History


  ** Mother


Family Medical History: No Reported History


Additional Family Medical History / Comment(s): .





  ** Father


Family Medical History: Osteoarthritis (OA)


Additional Family Medical History / Comment(s):  at age 78





Surgical - Exam


                                   Vital Signs











Temp Pulse Resp BP


 


 98 F   65   12   131/75 


 


 23 14:20  23 14:20  23 14:20  23 14:20














Bariatric Checklist


Checklist: 


Plan: 





Checklist: 





EGD: 


1. Hiatal hernia: 


2. H. Pylori: 





HgbA1c: 





Vitamin D: 





Smoking: Former smoker





Primary care physician referral: DR. TIFFANY RASMUSSEN





Psychiatry clearance: 





Cardiology clearance: 





Sleep study: 





Diet journal: 





VTE risk score: 





VTE risk level: 





Rehab needs at discharge:

## 2023-03-15 ENCOUNTER — HOSPITAL ENCOUNTER (OUTPATIENT)
Dept: HOSPITAL 47 - BARWHC3 | Age: 58
End: 2023-03-15
Attending: SURGERY
Payer: MEDICARE

## 2023-03-15 VITALS — SYSTOLIC BLOOD PRESSURE: 128 MMHG | TEMPERATURE: 98.6 F | HEART RATE: 70 BPM | DIASTOLIC BLOOD PRESSURE: 81 MMHG

## 2023-03-15 VITALS — BODY MASS INDEX: 53.4 KG/M2

## 2023-03-15 DIAGNOSIS — Z88.5: ICD-10-CM

## 2023-03-15 DIAGNOSIS — Z87.891: ICD-10-CM

## 2023-03-15 DIAGNOSIS — Z88.8: ICD-10-CM

## 2023-03-15 DIAGNOSIS — E66.01: Primary | ICD-10-CM

## 2023-03-15 PROCEDURE — 99211 OFF/OP EST MAY X REQ PHY/QHP: CPT

## 2023-03-15 NOTE — P.BASOAP
Subjective


Progress Note Date: 03/15/23





He wants skin removal surgery. He has lost 2 pounds. Highest 411 pounds. Wants 

to get 311 pounds. Iron was low. Recommend food diary journal 





Objective





- Vital Signs


Vital signs: 


                                   Vital Signs











Temp  98.6 F   03/15/23 15:34


 


Pulse  70   03/15/23 15:34


 


Resp      


 


BP  128/81   03/15/23 15:34


 


Pulse Ox      


 


FiO2      








                                 Intake & Output











 03/14/23 03/15/23 03/15/23





 18:59 06:59 18:59


 


Weight   154.675 kg














Assessment/Plan


Plan: 


Date: 03/15/23





Initial Weight: 175.812 kg





Initial BMI: 60.7





Current Weight: 154.675 kg





Current BMI: 53.4





Type of Surgery: 





Total Volume in Band: 





Previous Volume: 





Volume Removed: 





Volume Added: 





Band Size:

## 2023-04-19 ENCOUNTER — HOSPITAL ENCOUNTER (OUTPATIENT)
Dept: HOSPITAL 47 - BARWHC3 | Age: 58
End: 2023-04-19
Attending: SURGERY
Payer: MEDICARE

## 2023-04-19 VITALS — DIASTOLIC BLOOD PRESSURE: 84 MMHG | SYSTOLIC BLOOD PRESSURE: 122 MMHG | HEART RATE: 79 BPM | TEMPERATURE: 97.7 F

## 2023-04-19 VITALS — BODY MASS INDEX: 53.1 KG/M2

## 2023-04-19 DIAGNOSIS — Z87.891: ICD-10-CM

## 2023-04-19 DIAGNOSIS — E66.01: Primary | ICD-10-CM

## 2023-04-19 DIAGNOSIS — Z88.8: ICD-10-CM

## 2023-04-19 DIAGNOSIS — Z88.5: ICD-10-CM

## 2023-04-19 PROCEDURE — 99211 OFF/OP EST MAY X REQ PHY/QHP: CPT

## 2023-04-19 NOTE — P.BASOAP
Subjective


Progress Note Date: 04/19/23





He is losing weight. He is walking. Labs follow up





Objective





- Vital Signs


Vital signs: 


                                   Vital Signs











Temp  97.7 F   04/19/23 15:59


 


Pulse  79   04/19/23 15:59


 


Resp      


 


BP  122/84   04/19/23 15:59


 


Pulse Ox      


 


FiO2      








                                 Intake & Output











 04/18/23 04/19/23 04/19/23





 18:59 06:59 18:59


 


Weight   153.768 kg














Assessment/Plan


Plan: 


Date: 04/19/23





Initial Weight: 175.812 kg





Initial BMI: 60.7





Current Weight: 153.768 kg





Current BMI: 53.1





Type of Surgery: 





Total Volume in Band: 





Previous Volume: 





Volume Removed: 





Volume Added: 





Band Size: